# Patient Record
Sex: FEMALE | Race: WHITE | NOT HISPANIC OR LATINO | URBAN - METROPOLITAN AREA
[De-identification: names, ages, dates, MRNs, and addresses within clinical notes are randomized per-mention and may not be internally consistent; named-entity substitution may affect disease eponyms.]

---

## 2024-07-22 ENCOUNTER — HOSPITAL ENCOUNTER (INPATIENT)
Facility: HOSPITAL | Age: 89
LOS: 4 days | Discharge: HOSPICE | DRG: 481 | End: 2024-07-26
Attending: STUDENT IN AN ORGANIZED HEALTH CARE EDUCATION/TRAINING PROGRAM | Admitting: HOSPITALIST
Payer: MEDICARE

## 2024-07-22 ENCOUNTER — ANESTHESIA EVENT (OUTPATIENT)
Dept: SURGERY | Facility: HOSPITAL | Age: 89
End: 2024-07-22
Payer: MEDICARE

## 2024-07-22 DIAGNOSIS — S72.452A CLOSED SUPRACONDYLAR FRACTURE OF LEFT FEMUR, INITIAL ENCOUNTER: Primary | ICD-10-CM
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
## 2024-07-22 DIAGNOSIS — T14.90XA TRAUMA: ICD-10-CM

## 2024-07-22 DIAGNOSIS — S72.492A OTHER CLOSED FRACTURE OF DISTAL END OF LEFT FEMUR, INITIAL ENCOUNTER: ICD-10-CM

## 2024-07-22 DIAGNOSIS — Z01.818 PRE-OP EVALUATION: ICD-10-CM

## 2024-07-22 DIAGNOSIS — S72.92XA CLOSED FRACTURE OF LEFT FEMUR, UNSPECIFIED FRACTURE MORPHOLOGY, UNSPECIFIED PORTION OF FEMUR, INITIAL ENCOUNTER: ICD-10-CM

## 2024-07-22 PROBLEM — Z71.89 ACP (ADVANCE CARE PLANNING): Status: ACTIVE | Noted: 2024-07-22

## 2024-07-22 PROBLEM — F03.90 DEMENTIA WITHOUT BEHAVIORAL DISTURBANCE, PSYCHOTIC DISTURBANCE, MOOD DISTURBANCE, OR ANXIETY: Status: ACTIVE | Noted: 2024-07-22

## 2024-07-22 PROBLEM — N28.9 KIDNEY DISEASE: Status: ACTIVE | Noted: 2024-07-22

## 2024-07-22 PROBLEM — S72.402A CLOSED FRACTURE OF LEFT DISTAL FEMUR: Status: ACTIVE | Noted: 2024-07-22

## 2024-07-22 PROBLEM — D64.9 NORMOCYTIC ANEMIA: Status: ACTIVE | Noted: 2024-07-22

## 2024-07-22 PROBLEM — I10 HTN (HYPERTENSION): Status: ACTIVE | Noted: 2024-07-22

## 2024-07-22 LAB
25(OH)D3+25(OH)D2 SERPL-MCNC: 33 NG/ML (ref 30–96)
ABO + RH BLD: NORMAL
ANION GAP SERPL CALC-SCNC: 7 MMOL/L (ref 8–16)
BASOPHILS # BLD AUTO: 0.03 K/UL (ref 0–0.2)
BASOPHILS NFR BLD: 0.3 % (ref 0–1.9)
BILIRUB UR QL STRIP: NEGATIVE
BLD GP AB SCN CELLS X3 SERPL QL: NORMAL
BNP SERPL-MCNC: 187 PG/ML (ref 0–99)
BUN SERPL-MCNC: 50 MG/DL (ref 10–30)
CALCIUM SERPL-MCNC: 8.4 MG/DL (ref 8.7–10.5)
CHLORIDE SERPL-SCNC: 108 MMOL/L (ref 95–110)
CLARITY UR REFRACT.AUTO: CLEAR
CO2 SERPL-SCNC: 23 MMOL/L (ref 23–29)
COLOR UR AUTO: YELLOW
CREAT SERPL-MCNC: 1.7 MG/DL (ref 0.5–1.4)
DIFFERENTIAL METHOD BLD: ABNORMAL
EOSINOPHIL # BLD AUTO: 0 K/UL (ref 0–0.5)
EOSINOPHIL NFR BLD: 0 % (ref 0–8)
ERYTHROCYTE [DISTWIDTH] IN BLOOD BY AUTOMATED COUNT: 14.9 % (ref 11.5–14.5)
EST. GFR  (NO RACE VARIABLE): 27.1 ML/MIN/1.73 M^2
ESTIMATED AVG GLUCOSE: 108 MG/DL (ref 68–131)
GLUCOSE SERPL-MCNC: 143 MG/DL (ref 70–110)
GLUCOSE UR QL STRIP: ABNORMAL
HBA1C MFR BLD: 5.4 % (ref 4–5.6)
HCT VFR BLD AUTO: 26.9 % (ref 37–48.5)
HGB BLD-MCNC: 8.4 G/DL (ref 12–16)
HGB UR QL STRIP: ABNORMAL
HIV 1+2 AB+HIV1 P24 AG SERPL QL IA: NORMAL
IMM GRANULOCYTES # BLD AUTO: 0.37 K/UL (ref 0–0.04)
IMM GRANULOCYTES NFR BLD AUTO: 3.5 % (ref 0–0.5)
INR PPP: 0.9 (ref 0.8–1.2)
KETONES UR QL STRIP: NEGATIVE
LEUKOCYTE ESTERASE UR QL STRIP: NEGATIVE
LYMPHOCYTES # BLD AUTO: 0.7 K/UL (ref 1–4.8)
LYMPHOCYTES NFR BLD: 6.4 % (ref 18–48)
MAGNESIUM SERPL-MCNC: 2.5 MG/DL (ref 1.6–2.6)
MCH RBC QN AUTO: 30 PG (ref 27–31)
MCHC RBC AUTO-ENTMCNC: 31.2 G/DL (ref 32–36)
MCV RBC AUTO: 96 FL (ref 82–98)
MONOCYTES # BLD AUTO: 0.8 K/UL (ref 0.3–1)
MONOCYTES NFR BLD: 7.3 % (ref 4–15)
NEUTROPHILS # BLD AUTO: 8.8 K/UL (ref 1.8–7.7)
NEUTROPHILS NFR BLD: 82.5 % (ref 38–73)
NITRITE UR QL STRIP: NEGATIVE
NRBC BLD-RTO: 0 /100 WBC
PH UR STRIP: 5 [PH] (ref 5–8)
PHOSPHATE SERPL-MCNC: 3.2 MG/DL (ref 2.7–4.5)
PLATELET # BLD AUTO: 334 K/UL (ref 150–450)
PMV BLD AUTO: 11.4 FL (ref 9.2–12.9)
POTASSIUM SERPL-SCNC: 4.7 MMOL/L (ref 3.5–5.1)
PREALB SERPL-MCNC: 22 MG/DL (ref 20–43)
PROT UR QL STRIP: ABNORMAL
PROTHROMBIN TIME: 10.3 SEC (ref 9–12.5)
RBC # BLD AUTO: 2.8 M/UL (ref 4–5.4)
SODIUM SERPL-SCNC: 138 MMOL/L (ref 136–145)
SP GR UR STRIP: 1.02 (ref 1–1.03)
SPECIMEN OUTDATE: NORMAL
TRANSFERRIN SERPL-MCNC: 203 MG/DL (ref 200–375)
TROPONIN I SERPL DL<=0.01 NG/ML-MCNC: 0.01 NG/ML (ref 0–0.03)
URN SPEC COLLECT METH UR: ABNORMAL
WBC # BLD AUTO: 10.7 K/UL (ref 3.9–12.7)

## 2024-07-22 PROCEDURE — 84134 ASSAY OF PREALBUMIN: CPT

## 2024-07-22 PROCEDURE — 82306 VITAMIN D 25 HYDROXY: CPT

## 2024-07-22 PROCEDURE — 81003 URINALYSIS AUTO W/O SCOPE: CPT

## 2024-07-22 PROCEDURE — 83036 HEMOGLOBIN GLYCOSYLATED A1C: CPT

## 2024-07-22 PROCEDURE — 25000003 PHARM REV CODE 250: Performed by: STUDENT IN AN ORGANIZED HEALTH CARE EDUCATION/TRAINING PROGRAM

## 2024-07-22 PROCEDURE — 83735 ASSAY OF MAGNESIUM: CPT

## 2024-07-22 PROCEDURE — 84484 ASSAY OF TROPONIN QUANT: CPT | Performed by: HOSPITALIST

## 2024-07-22 PROCEDURE — 85610 PROTHROMBIN TIME: CPT

## 2024-07-22 PROCEDURE — 93010 ELECTROCARDIOGRAM REPORT: CPT | Mod: ,,, | Performed by: INTERNAL MEDICINE

## 2024-07-22 PROCEDURE — 87389 HIV-1 AG W/HIV-1&-2 AB AG IA: CPT | Performed by: PHYSICIAN ASSISTANT

## 2024-07-22 PROCEDURE — 63600175 PHARM REV CODE 636 W HCPCS: Performed by: STUDENT IN AN ORGANIZED HEALTH CARE EDUCATION/TRAINING PROGRAM

## 2024-07-22 PROCEDURE — 0QHH34Z INSERTION OF INTERNAL FIXATION DEVICE INTO LEFT TIBIA, PERCUTANEOUS APPROACH: ICD-10-PCS | Performed by: ORTHOPAEDIC SURGERY

## 2024-07-22 PROCEDURE — 96374 THER/PROPH/DIAG INJ IV PUSH: CPT

## 2024-07-22 PROCEDURE — 80048 BASIC METABOLIC PNL TOTAL CA: CPT | Performed by: STUDENT IN AN ORGANIZED HEALTH CARE EDUCATION/TRAINING PROGRAM

## 2024-07-22 PROCEDURE — 86850 RBC ANTIBODY SCREEN: CPT | Performed by: STUDENT IN AN ORGANIZED HEALTH CARE EDUCATION/TRAINING PROGRAM

## 2024-07-22 PROCEDURE — 63600175 PHARM REV CODE 636 W HCPCS

## 2024-07-22 PROCEDURE — 84100 ASSAY OF PHOSPHORUS: CPT

## 2024-07-22 PROCEDURE — 83880 ASSAY OF NATRIURETIC PEPTIDE: CPT | Performed by: HOSPITALIST

## 2024-07-22 PROCEDURE — 84466 ASSAY OF TRANSFERRIN: CPT

## 2024-07-22 PROCEDURE — 99285 EMERGENCY DEPT VISIT HI MDM: CPT | Mod: 25

## 2024-07-22 PROCEDURE — 93005 ELECTROCARDIOGRAM TRACING: CPT

## 2024-07-22 PROCEDURE — 85025 COMPLETE CBC W/AUTO DIFF WBC: CPT | Performed by: STUDENT IN AN ORGANIZED HEALTH CARE EDUCATION/TRAINING PROGRAM

## 2024-07-22 PROCEDURE — 11000001 HC ACUTE MED/SURG PRIVATE ROOM

## 2024-07-22 RX ORDER — GLUCAGON 1 MG
1 KIT INJECTION
Status: DISCONTINUED | OUTPATIENT
Start: 2024-07-22 | End: 2024-07-26 | Stop reason: HOSPADM

## 2024-07-22 RX ORDER — MORPHINE SULFATE 4 MG/ML
4 INJECTION, SOLUTION INTRAMUSCULAR; INTRAVENOUS
Status: COMPLETED | OUTPATIENT
Start: 2024-07-22 | End: 2024-07-22

## 2024-07-22 RX ORDER — TALC
6 POWDER (GRAM) TOPICAL NIGHTLY PRN
Status: DISCONTINUED | OUTPATIENT
Start: 2024-07-22 | End: 2024-07-26 | Stop reason: HOSPADM

## 2024-07-22 RX ORDER — ACETAMINOPHEN 325 MG/1
650 TABLET ORAL EVERY 4 HOURS PRN
Status: DISCONTINUED | OUTPATIENT
Start: 2024-07-22 | End: 2024-07-22

## 2024-07-22 RX ORDER — HYDROCODONE BITARTRATE AND ACETAMINOPHEN 500; 5 MG/1; MG/1
TABLET ORAL
Status: DISCONTINUED | OUTPATIENT
Start: 2024-07-22 | End: 2024-07-26 | Stop reason: HOSPADM

## 2024-07-22 RX ORDER — NALOXONE HCL 0.4 MG/ML
0.02 VIAL (ML) INJECTION
Status: DISCONTINUED | OUTPATIENT
Start: 2024-07-22 | End: 2024-07-26 | Stop reason: HOSPADM

## 2024-07-22 RX ORDER — ACETAMINOPHEN 500 MG
1000 TABLET ORAL EVERY 8 HOURS
Status: DISPENSED | OUTPATIENT
Start: 2024-07-23 | End: 2024-07-24

## 2024-07-22 RX ORDER — NAPROXEN SODIUM 220 MG/1
81 TABLET, FILM COATED ORAL DAILY
COMMUNITY

## 2024-07-22 RX ORDER — ONDANSETRON HYDROCHLORIDE 2 MG/ML
4 INJECTION, SOLUTION INTRAVENOUS EVERY 8 HOURS PRN
Status: DISCONTINUED | OUTPATIENT
Start: 2024-07-22 | End: 2024-07-26 | Stop reason: HOSPADM

## 2024-07-22 RX ORDER — AMLODIPINE BESYLATE 5 MG/1
5 TABLET ORAL DAILY
Status: DISCONTINUED | OUTPATIENT
Start: 2024-07-23 | End: 2024-07-26 | Stop reason: HOSPADM

## 2024-07-22 RX ORDER — AMLODIPINE BESYLATE 5 MG/1
5 TABLET ORAL DAILY
COMMUNITY

## 2024-07-22 RX ORDER — MEMANTINE HYDROCHLORIDE 5 MG/1
5 TABLET ORAL 2 TIMES DAILY
COMMUNITY

## 2024-07-22 RX ORDER — TIMOLOL MALEATE 2.5 MG/ML
1 SOLUTION/ DROPS OPHTHALMIC NIGHTLY
Status: DISCONTINUED | OUTPATIENT
Start: 2024-07-23 | End: 2024-07-26 | Stop reason: HOSPADM

## 2024-07-22 RX ORDER — MORPHINE SULFATE 2 MG/ML
2 INJECTION, SOLUTION INTRAMUSCULAR; INTRAVENOUS ONCE
Status: COMPLETED | OUTPATIENT
Start: 2024-07-22 | End: 2024-07-22

## 2024-07-22 RX ORDER — IBUPROFEN 200 MG
24 TABLET ORAL
Status: DISCONTINUED | OUTPATIENT
Start: 2024-07-22 | End: 2024-07-26 | Stop reason: HOSPADM

## 2024-07-22 RX ORDER — PROCHLORPERAZINE EDISYLATE 5 MG/ML
5 INJECTION INTRAMUSCULAR; INTRAVENOUS EVERY 6 HOURS PRN
Status: DISCONTINUED | OUTPATIENT
Start: 2024-07-22 | End: 2024-07-26 | Stop reason: HOSPADM

## 2024-07-22 RX ORDER — IBUPROFEN 200 MG
16 TABLET ORAL
Status: DISCONTINUED | OUTPATIENT
Start: 2024-07-22 | End: 2024-07-26 | Stop reason: HOSPADM

## 2024-07-22 RX ORDER — ONDANSETRON 4 MG/1
4 TABLET, ORALLY DISINTEGRATING ORAL
Status: COMPLETED | OUTPATIENT
Start: 2024-07-22 | End: 2024-07-22

## 2024-07-22 RX ORDER — QUETIAPINE FUMARATE 100 MG/1
100 TABLET, FILM COATED ORAL NIGHTLY
Status: DISCONTINUED | OUTPATIENT
Start: 2024-07-22 | End: 2024-07-26 | Stop reason: HOSPADM

## 2024-07-22 RX ORDER — NAPROXEN SODIUM 220 MG/1
81 TABLET, FILM COATED ORAL DAILY
Status: DISCONTINUED | OUTPATIENT
Start: 2024-07-23 | End: 2024-07-26 | Stop reason: HOSPADM

## 2024-07-22 RX ORDER — SODIUM CHLORIDE 0.9 % (FLUSH) 0.9 %
10 SYRINGE (ML) INJECTION
Status: DISCONTINUED | OUTPATIENT
Start: 2024-07-22 | End: 2024-07-26 | Stop reason: HOSPADM

## 2024-07-22 RX ORDER — OXYCODONE HYDROCHLORIDE 5 MG/1
5 TABLET ORAL EVERY 6 HOURS PRN
Status: DISCONTINUED | OUTPATIENT
Start: 2024-07-22 | End: 2024-07-23

## 2024-07-22 RX ORDER — AMOXICILLIN 250 MG
1 CAPSULE ORAL DAILY
Status: DISCONTINUED | OUTPATIENT
Start: 2024-07-23 | End: 2024-07-26 | Stop reason: HOSPADM

## 2024-07-22 RX ORDER — MEMANTINE HYDROCHLORIDE 5 MG/1
5 TABLET ORAL 2 TIMES DAILY
Status: DISCONTINUED | OUTPATIENT
Start: 2024-07-22 | End: 2024-07-26 | Stop reason: HOSPADM

## 2024-07-22 RX ORDER — QUETIAPINE FUMARATE 25 MG/1
50 TABLET, FILM COATED ORAL DAILY
Status: DISCONTINUED | OUTPATIENT
Start: 2024-07-23 | End: 2024-07-23

## 2024-07-22 RX ORDER — QUETIAPINE FUMARATE 100 MG/1
50 TABLET, FILM COATED ORAL
COMMUNITY

## 2024-07-22 RX ORDER — OXYCODONE HYDROCHLORIDE 10 MG/1
10 TABLET ORAL EVERY 6 HOURS PRN
Status: DISCONTINUED | OUTPATIENT
Start: 2024-07-22 | End: 2024-07-23

## 2024-07-22 RX ADMIN — MORPHINE SULFATE 4 MG: 4 INJECTION INTRAVENOUS at 06:07

## 2024-07-22 RX ADMIN — ONDANSETRON 4 MG: 4 TABLET, ORALLY DISINTEGRATING ORAL at 03:07

## 2024-07-22 RX ADMIN — MORPHINE SULFATE 2 MG: 2 INJECTION, SOLUTION INTRAMUSCULAR; INTRAVENOUS at 08:07

## 2024-07-22 NOTE — HPI
Barby Burrell is a 89 y.o. female with PMH significant for R hip fx 5 years ago s/p IMN and L hip fx 4 years ago s/p short IMN, HTN and dementia presenting with left leg pain. Patient lives in a care facility and on Friday morning her daughter noticed some left distal femur swelling. Patient was also having left thigh pain. Due to persistent left thigh pain over the weekend despite medications, she had an XR done today at the facility which showed a left distal femur fracture. Around 1 week ago, the patient was also having some right knee pain. The family is unsure of how this fracture happened and are not aware of any recents falls or trauma. Patient moved to her current care facility around 1 month ago as she was previously living with her daughters who were providing care. She does have a history of falls. Patient denies numbness and tingling. She has some right knee pain today as well. She is nonambulatory at baseline. Does not take any anticoagulation at baseline.

## 2024-07-22 NOTE — ED NOTES
Pt had urinary incontinence episode. Bed was stripped and sanitized, clean bed sheets placed. Pt was cleaned and changed into new gown. Pt resting comfortably at this time.

## 2024-07-22 NOTE — FIRST PROVIDER EVALUATION
Medical screening examination initiated.  I have conducted a focused provider triage encounter, findings are as follows:    Brief history of present illness:  presents from hospice w/ L upper leg deformity. No h/o trauma    Vitals:    07/22/24 1440   BP: 123/86   Pulse: 63   Resp: 18   Temp: 98.8 °F (37.1 °C)   TempSrc: Oral   SpO2: 95%       Pertinent physical exam:  bed bound    Brief workup plan:  xr L hip, femur, knee    Preliminary workup initiated; this workup will be continued and followed by the physician or advanced practice provider that is assigned to the patient when roomed.

## 2024-07-22 NOTE — ED PROVIDER NOTES
Encounter Date: 7/22/2024       History     Chief Complaint   Patient presents with    Leg Injury     Arrived via ems from [address] [place] [address] with c/o deformity to left upper leg, denies pt with fall or trauma, x-ray done today with distal femur fracture diagnosed, pt oriented to self only at baseline     HPI    89 y/o F PMH HTN, dementia who presents to the ED for eval of leg pain.  Patient lives in a [facility] hosp[facility] facility. Two days ago the daughter noticed significant swelling of the left/right knees. Today she had an XR done at the facility showing a large fracture of the left distal femur.  Family brought her in for evaluation.  She was unaware of any fall that was reported at the facility.  Patient is largely wheelchair-bound/bed-bound.  She does not walk independently.  Patient is complaining of bilateral hip and leg pain in the ED, she was able to provide some history.      Review of patient's allergies indicates:  Not on File  No past medical history on file.  No past surgical history on file.  No family history on file.     Review of Systems   Constitutional:  Negative for fever.   Respiratory:  Negative for shortness of breath.    Cardiovascular:  Negative for chest pain.   Musculoskeletal:  Positive for arthralgias and joint swelling.       Physical Exam     Initial Vitals [07/22/24 1440]   BP Pulse Resp Temp SpO2   123/86 63 18 98.8 °F (37.1 °C) 95 %      MAP       --         Physical Exam    Nursing note and vitals reviewed.  Constitutional: No distress.   HENT:   No signs of severe head or neck trauma   Eyes: EOM are normal.   Neck: Neck supple.   No C-spine tenderness to palpation   Normal range of motion.  Cardiovascular:  Normal rate and regular rhythm.           Pulmonary/Chest: Breath sounds normal. No respiratory distress.   Abdominal: Abdomen is soft. There is no abdominal tenderness.   Musculoskeletal:      Cervical back: Normal range of motion and neck supple.      Comments:  Significant pain and swelling of the left and right knees, pain with range of motion of both knees, good pulses DP LLE     Neurological: She is alert.   Answers most questions, intermittent confusion which is reportedly baseline, moves both upper extremities   Skin: Skin is warm and dry.   Bruising noted of the distal lower extremities   Psychiatric: She has a normal mood and affect. Thought content normal.         ED Course   Procedures  Labs Reviewed   CBC W/ AUTO DIFFERENTIAL - Abnormal       Result Value    WBC 10.70      RBC 2.80 (*)     Hemoglobin 8.4 (*)     Hematocrit 26.9 (*)     MCV 96      MCH 30.0      MCHC 31.2 (*)     RDW 14.9 (*)     Platelets 334      MPV 11.4      Immature Granulocytes 3.5 (*)     Gran # (ANC) 8.8 (*)     Immature Grans (Abs) 0.37 (*)     Lymph # 0.7 (*)     Mono # 0.8      Eos # 0.0      Baso # 0.03      nRBC 0      Gran % 82.5 (*)     Lymph % 6.4 (*)     Mono % 7.3      Eosinophil % 0.0      Basophil % 0.3      Differential Method Automated     BASIC METABOLIC PANEL - Abnormal    Sodium 138      Potassium 4.7      Chloride 108      CO2 23      Glucose 143 (*)     BUN 50 (*)     Creatinine 1.7 (*)     Calcium 8.4 (*)     Anion Gap 7 (*)     eGFR 27.1 (*)    B-TYPE NATRIURETIC PEPTIDE - Abnormal     (*)    HIV 1 / 2 ANTIBODY    HIV 1/2 Ag/Ab Non-reactive      Narrative:     Release to patient->Immediate   TROPONIN I    Troponin I 0.014     HEMOGLOBIN A1C    Hemoglobin A1C 5.4      Estimated Avg Glucose 108     MAGNESIUM    Magnesium 2.5     PHOSPHORUS    Phosphorus 3.2     PREALBUMIN    Prealbumin 22     PROTIME-INR    Prothrombin Time 10.3      INR 0.9     TRANSFERRIN    Transferrin 203     VITAMIN D    Vit D, 25-Hydroxy 33     TYPE & SCREEN    Group & Rh A NEG      Indirect Jennifer NEG      Specimen Outdate 07/25/2024 23:59            Imaging Results              CT 3D Rendering WO Independent Workstation (Final result)  Result time 07/22/24 23:01:20      Final result by  "Magdi Bautista MD (07/22/24 23:01:20)                   Impression:      Comminuted and displaced fracture of the distal femur shaft.      Electronically signed by: Magdi Bautista MD  Date:    07/22/2024  Time:    23:01               Narrative:    EXAMINATION:  CT THIGH WITHOUT CONTRAST LEFT; CT 3D RENDERING WO INDEPENDENT WORKSTATION    CLINICAL HISTORY:  Provided history is "knee trauma, occult fracture suspected".    TECHNIQUE:  CT images of the left thigh obtained without IV contrast.  Axial, coronal, and sagittal reconstructions were created from the source data.    3D reconstructed images were acquired by post processing on an independent workstation with concurrent supervision and archived for permanent record. 3D imaging of the left thigh was obtained for further evaluation and for operative planning if needed.    COMPARISON:  Left femur radiograph, 07/22/2024.    FINDINGS:  Bones are demineralized.  Operative changes of ORIF with intramedullary christen in the left proximal femur.  Comminuted and displaced acute fracture in the distal femur shaft with anteromedial displacement of the distal fracture fragments.  Approximately 2.5 cm fracture fragment separation in the distal femoral shaft best seen on the sagittal images.  No convincing intra-articular fracture.  No dislocation.  Degenerative changes in the knee and hip.  Soft tissue edema adjacent to the fracture site.  No significant subcutaneous emphysema.  Trace suprapatellar effusion.                                       CT Thigh Without Contrast Left (Final result)  Result time 07/22/24 23:01:20   Procedure changed from CT Knee Without Contrast Left     Final result by Magdi Bautista MD (07/22/24 23:01:20)                   Impression:      Comminuted and displaced fracture of the distal femur shaft.      Electronically signed by: Magdi Bautista MD  Date:    07/22/2024  Time:    23:01               Narrative:    EXAMINATION:  CT THIGH WITHOUT " "CONTRAST LEFT; CT 3D RENDERING WO INDEPENDENT WORKSTATION    CLINICAL HISTORY:  Provided history is "knee trauma, occult fracture suspected".    TECHNIQUE:  CT images of the left thigh obtained without IV contrast.  Axial, coronal, and sagittal reconstructions were created from the source data.    3D reconstructed images were acquired by post processing on an independent workstation with concurrent supervision and archived for permanent record. 3D imaging of the left thigh was obtained for further evaluation and for operative planning if needed.    COMPARISON:  Left femur radiograph, 07/22/2024.    FINDINGS:  Bones are demineralized.  Operative changes of ORIF with intramedullary christen in the left proximal femur.  Comminuted and displaced acute fracture in the distal femur shaft with anteromedial displacement of the distal fracture fragments.  Approximately 2.5 cm fracture fragment separation in the distal femoral shaft best seen on the sagittal images.  No convincing intra-articular fracture.  No dislocation.  Degenerative changes in the knee and hip.  Soft tissue edema adjacent to the fracture site.  No significant subcutaneous emphysema.  Trace suprapatellar effusion.                                       X-Ray Knee 3 View Left (Final result)  Result time 07/22/24 22:39:59      Final result by Magdi Bautista MD (07/22/24 22:39:59)                   Impression:      See findings above.      Electronically signed by: Magdi Bautista MD  Date:    07/22/2024  Time:    22:39               Narrative:    EXAMINATION:  XR KNEE 3 VIEW LEFT    CLINICAL HISTORY:  Traction pin eval;    TECHNIQUE:  AP, lateral, and Merchant views of the left knee were performed.    COMPARISON:  07/22/2024 at 16:45.    FINDINGS:  Similar to mildly improved alignment of comminuted and displaced distal femur fracture with traction in place.  No obvious new acute displaced fracture.  Bones are demineralized.  Further evaluation/follow-up as " warranted clinically.                                       X-Ray Chest AP Portable (Final result)  Result time 07/22/24 18:43:41      Final result by Filipe Smalls MD (07/22/24 18:43:41)                   Impression:      1. Interstitial findings are accentuated by habitus and shallow inspiratory effort.  Underlying edema is a consideration.  Correlation however is needed.      Electronically signed by: Filipe Smalls MD  Date:    07/22/2024  Time:    18:43               Narrative:    EXAMINATION:  XR CHEST AP PORTABLE    CLINICAL HISTORY:  Pre-op clearance;    TECHNIQUE:  Single frontal view of the chest was performed.    COMPARISON:  None    FINDINGS:  The cardiomediastinal silhouette is not enlarged, magnified by technique.  There is elevation of the right hemidiaphragm..  There is obscuration of the left costophrenic angle, possibly reflecting effusion..  The trachea is midline.  The lungs are symmetrically expanded bilaterally with coarse interstitial attenuation and left basilar subsegmental atelectasis..  No large focal consolidation seen.  There is no pneumothorax.  The osseous structures are remarkable for degenerative change noting osteopenia..                                       X-Ray Femur AP/LAT Left (Final result)  Result time 07/22/24 17:42:53      Final result by Filipe Smalls MD (07/22/24 17:42:53)                   Impression:      1. Distal left femoral fracture as described.      Electronically signed by: Filipe Smalls MD  Date:    07/22/2024  Time:    17:42               Narrative:    EXAMINATION:  XR FEMUR 2 VIEW LEFT    CLINICAL HISTORY:  Unspecified fall, initial encounter    TECHNIQUE:  AP and lateral views of the left femur were performed.    COMPARISON:  None}    FINDINGS:  Four views left femur.    The left femoral head maintains appropriate relationship with the acetabulum noting femoroacetabular degenerative change.  Intramedullary christen and nail construct noted of the  proximal femur.  There is comminuted displaced and impacted fracture involving the distal aspect of the left femur noting several fracture fragments lie about the fracture plane.  The knee appears otherwise intact however CT could be performed for more definitive evaluation as warranted.  There is osteopenia.                                       X-Ray Knee 3 View Left (Final result)  Result time 07/22/24 17:38:06      Final result by Filipe Smalls MD (07/22/24 17:38:06)                   Impression:      As above      Electronically signed by: Filipe Smalls MD  Date:    07/22/2024  Time:    17:38               Narrative:    EXAMINATION:  XR KNEE 3 VIEW LEFT    CLINICAL HISTORY:  Unspecified fall, initial encounter    TECHNIQUE:  AP, lateral, and Merchant views of the left knee were performed.    COMPARISON:  None    FINDINGS:  Two views left knee.    Please note, positioning is suboptimal as patient has distal left femoral fracture.    Allowing for positioning, no convincing acute displaced fracture or dislocation of the knee noting degenerative changes.  There is osteopenia.                                       XR Tibia Fibula 2 View Bilateral (Edited Result - FINAL)  Result time 07/22/24 17:40:28      Addendum (preliminary) 1 of 1 by Filipe Smalls MD (07/22/24 17:40:28)      Following image interpretation, radiographs became available of the right knee.  On that series, there is cortical irregularity involving the lateral aspect of the medial tibial plateau posteriorly, concerning for fracture.  Please see that exam.  Correlation with any focal tenderness recommended.      Electronically signed by: Filipe Smalls MD  Date:    07/22/2024  Time:    17:40                 Final result by Filipe Smalls MD (07/22/24 17:37:25)                   Impression:      1. No convincing acute displaced fracture or dislocation of the left or right tibia/fibula.  Patient has known distal left femoral fracture  described in separate report.      Electronically signed by: Filipe Smalls MD  Date:    07/22/2024  Time:    17:37               Narrative:    EXAMINATION:  XR TIBIA FIBULA 2 VIEW BILATERAL    CLINICAL HISTORY:  Injury, unspecified, initial encounter    COMPARISON:  None    FINDINGS:  Eight views bilateral tibia fibula.    There are degenerative changes of the knees.  There is osteopenia.  No acute displaced fracture or dislocation of the left or right tibia/fibula.  The left ankle mortise is intact.  The right ankle mortise is intact.  There are degenerative changes of the left and right calcaneus.  There is vascular calcification.  There is remote fracture of the left fibular diaphysis.                                       X-Ray Knee 3 View Right (Final result)  Result time 07/22/24 17:39:45      Final result by Filipe Smalls MD (07/22/24 17:39:45)                   Impression:      1. There is cortical irregularity involving the posterior aspect of the medial tibial plateau.  This may reflect fracture noting this was not readily apparent on the tib fib radiographs, likely related to positioning.  Correlation with any focal tenderness recommended.      Electronically signed by: Filipe Smalls MD  Date:    07/22/2024  Time:    17:39               Narrative:    EXAMINATION:  XR KNEE 3 VIEW RIGHT    CLINICAL HISTORY:  Injury, unspecified, initial encounter    TECHNIQUE:  AP, lateral, and Merchant views of the right knee were performed.    COMPARISON:  None    FINDINGS:  Three views right knee.    There is osteopenia.  There are degenerative changes of the knee.  On lateral view, there is concern for fracture of the posterior medial tibial plateau.  This was not evident on the tib fib radiographs, may be on the basis of positioning.  Correlation however with any focal tenderness is recommended as there is extensive degenerative change in the region.  No large knee joint effusion.                                        X-Ray Femur Ap/Lat Right (Final result)  Result time 07/22/24 17:35:56      Final result by Filipe Smalls MD (07/22/24 17:35:56)                   Impression:      1. No convincing acute displaced fracture or dislocation of the right femur noting comparison with previous examinations would be helpful to assess any interval nonunion.      Electronically signed by: Filipe Smalls MD  Date:    07/22/2024  Time:    17:35               Narrative:    EXAMINATION:  XR FEMUR 2 VIEW RIGHT    CLINICAL HISTORY:  Injury, unspecified, initial encounter    TECHNIQUE:  AP and lateral views of the right femur were performed.    COMPARISON:  None    FINDINGS:  Four views right femur.    The right femoral head maintains appropriate relationship with the acetabulum.  There is diffuse osteopenia.  Intramedullary christen and screw construct noted of the proximal femur.  Comparison with any previous examinations would be helpful 2 assess any regions of nonunion.  The distal aspect of the femur is intact.  There are degenerative changes of the knee.  No large knee joint effusion.  Please see separately dictated report for details of the left femoral fracture.                                       X-Ray Hips Bilateral 2 View Incl AP Pelvis (Final result)  Result time 07/22/24 17:51:05      Final result by Sim Dorsey MD (07/22/24 17:51:05)                   Impression:      No evidence of acute fracture or traumatic malalignment of the right hip or the pelvis.    Postop changes of prior ORIF of both hips.      Electronically signed by: Sim Dorsey MD  Date:    07/22/2024  Time:    17:51               Narrative:    EXAMINATION:  XR HIPS BILATERAL 2 VIEW INCL AP PELVIS    CLINICAL HISTORY:  Injury, unspecified, initial encounter    TECHNIQUE:  AP view of the pelvis and frogleg lateral views of both hips were performed.    COMPARISON:  None.    FINDINGS:  There are postop changes of bilateral open reduction internal fixation  of both proximal femurs for prior proximal femur fractures.  There is no acute fracture on the current examination, allowing for diffuse osteopenia.    There is joint space narrowing with osteophytosis.  There are no erosive changes.    The soft tissues are unremarkable.  No radiopaque foreign body is identified.                                       CT Head Without Contrast (Final result)  Result time 07/22/24 16:50:55      Final result by Chi Gomes DO (07/22/24 16:50:55)                   Impression:      CT head: No acute intracranial findings specifically without evidence for acute intracranial hemorrhage or sulcal effacement to suggest large territory recent infarction    Cerebral volume loss with patchy and confluent decreased attenuation supratentorial white matter while nonspecific concerning for chronic ischemic change.  Prominence of the lateral and 3rd ventricles which may be compensatory to volume loss component of normal pressure hydrocephalus not excluded.    CT cervical spine: Spondylo degenerative changes cervical spine with superimposed diffuse idiopathic skeletal hyperostosis.  No evidence for acute fracture or traumatic subluxation cervical spine.  Further evaluation as warranted clinically.    Electronically signed by resident: Michael Amaya  Date:    07/22/2024  Time:    16:13    Electronically signed by: Chi Gomes DO  Date:    07/22/2024  Time:    16:50               Narrative:    EXAMINATION:  CT HEAD WITHOUT CONTRAST; CT CERVICAL SPINE WITHOUT CONTRAST    CLINICAL HISTORY:  Head trauma, minor (Age >= 65y);; Neck trauma (Age >= 65y);    TECHNIQUE:  Low dose axial CT images obtained throughout the head and cervical spine without intravenous contrast.  Axial, sagittal and coronal reconstructions were performed.    COMPARISON:  None.    FINDINGS:  Head:    Generalized cerebral volume loss with compensatory enlargement of the ventricles.  Slight prominence lateral and 3rd ventricles  which may be compensatory to volume loss normal pressure hydrocephalus not excluded.    Moderate patchy and confluent hypoattenuation in the supratentorial white matter, nonspecific but most likely reflecting chronic microvascular ischemic changes.  Focal hypodensity in the right basal ganglia, likely prominent perivascular space allowing for distortion by motion artifacts there is no evidence for acute intracranial hemorrhage or sulcal effacement to suggest large territory recent infarction..    Visualized paranasal sinuses and mastoid air cells are clear.    Spine: Cervical sagittal alignment within limits with trace grade 1 anterolisthesis C2 on C3 and C7 on T1.    Prominent bridging osteophytes majority of the cervical and visualized upper thoracic levels compatible with diffuse idiopathic skeletal hyperostosis    Allowing for degenerative changes cervical vertebral body heights and contours are within normal is without evidence for acute fracture.  No consolidation visualized lung apices    Degenerative change atlantal dental joint with probable ligamental calcification along the posterior aspect the odontoid.    Allowing for CT technique degenerative change most prominent at C3/C4 with posterior disc osteophyte, uncovertebral joint hypertrophy and facet arthropathy mild central canal narrowing with moderate right neural foraminal stenosis.                                       CT Cervical Spine Without Contrast (Final result)  Result time 07/22/24 16:50:55      Final result by Chi Gomes DO (07/22/24 16:50:55)                   Impression:      CT head: No acute intracranial findings specifically without evidence for acute intracranial hemorrhage or sulcal effacement to suggest large territory recent infarction    Cerebral volume loss with patchy and confluent decreased attenuation supratentorial white matter while nonspecific concerning for chronic ischemic change.  Prominence of the lateral and 3rd  ventricles which may be compensatory to volume loss component of normal pressure hydrocephalus not excluded.    CT cervical spine: Spondylo degenerative changes cervical spine with superimposed diffuse idiopathic skeletal hyperostosis.  No evidence for acute fracture or traumatic subluxation cervical spine.  Further evaluation as warranted clinically.    Electronically signed by resident: Michael Amaya  Date:    07/22/2024  Time:    16:13    Electronically signed by: Chi Gomes DO  Date:    07/22/2024  Time:    16:50               Narrative:    EXAMINATION:  CT HEAD WITHOUT CONTRAST; CT CERVICAL SPINE WITHOUT CONTRAST    CLINICAL HISTORY:  Head trauma, minor (Age >= 65y);; Neck trauma (Age >= 65y);    TECHNIQUE:  Low dose axial CT images obtained throughout the head and cervical spine without intravenous contrast.  Axial, sagittal and coronal reconstructions were performed.    COMPARISON:  None.    FINDINGS:  Head:    Generalized cerebral volume loss with compensatory enlargement of the ventricles.  Slight prominence lateral and 3rd ventricles which may be compensatory to volume loss normal pressure hydrocephalus not excluded.    Moderate patchy and confluent hypoattenuation in the supratentorial white matter, nonspecific but most likely reflecting chronic microvascular ischemic changes.  Focal hypodensity in the right basal ganglia, likely prominent perivascular space allowing for distortion by motion artifacts there is no evidence for acute intracranial hemorrhage or sulcal effacement to suggest large territory recent infarction..    Visualized paranasal sinuses and mastoid air cells are clear.    Spine: Cervical sagittal alignment within limits with trace grade 1 anterolisthesis C2 on C3 and C7 on T1.    Prominent bridging osteophytes majority of the cervical and visualized upper thoracic levels compatible with diffuse idiopathic skeletal hyperostosis    Allowing for degenerative changes cervical vertebral  body heights and contours are within normal is without evidence for acute fracture.  No consolidation visualized lung apices    Degenerative change atlantal dental joint with probable ligamental calcification along the posterior aspect the odontoid.    Allowing for CT technique degenerative change most prominent at C3/C4 with posterior disc osteophyte, uncovertebral joint hypertrophy and facet arthropathy mild central canal narrowing with moderate right neural foraminal stenosis.                                       Medications   sodium chloride 0.9% flush 10 mL (has no administration in time range)   melatonin tablet 6 mg (has no administration in time range)   ondansetron injection 4 mg (has no administration in time range)   prochlorperazine injection Soln 5 mg (has no administration in time range)   naloxone 0.4 mg/mL injection 0.02 mg (has no administration in time range)   glucose chewable tablet 16 g (has no administration in time range)   glucose chewable tablet 24 g (has no administration in time range)   glucagon (human recombinant) injection 1 mg (has no administration in time range)   dextrose 10% bolus 125 mL 125 mL (has no administration in time range)   dextrose 10% bolus 250 mL 250 mL (has no administration in time range)   0.9%  NaCl infusion (for blood administration) (has no administration in time range)   oxyCODONE immediate release tablet 5 mg (has no administration in time range)   oxyCODONE immediate release tablet Tab 10 mg (has no administration in time range)   amLODIPine tablet 5 mg (has no administration in time range)   aspirin chewable tablet 81 mg (has no administration in time range)   memantine tablet 5 mg (has no administration in time range)   QUEtiapine tablet 100 mg (has no administration in time range)     And   QUEtiapine tablet 50 mg (has no administration in time range)   timolol maleate 0.25% ophthalmic solution 1 drop (has no administration in time range)   acetaminophen  tablet 1,000 mg (has no administration in time range)   senna-docusate 8.6-50 mg per tablet 1 tablet (has no administration in time range)   ondansetron disintegrating tablet 4 mg (4 mg Oral Given 7/22/24 1549)   morphine injection 4 mg (4 mg Intravenous Given 7/22/24 1815)   morphine injection 2 mg (2 mg Intravenous Given 7/22/24 2030)     Medical Decision Making  Amount and/or Complexity of Data Reviewed  Labs: ordered.  Radiology: ordered.    Risk  Prescription drug management.  Decision regarding hospitalization.               ED Course as of 07/23/24 0035 Mon Jul 22, 2024   1515 Home XR reviewed on caregiver's phone. Ortho consult and repeat views placed [DS]      ED Course User Index  [DS] Sessions, Shade KO MD                       89 F here with leg pain.  VSS in ED, acute deformity of the left knee, bruising of b/l LE.  Xrs show acute distal left femur fracture.  Normal WBC, normal lytes.  CTB and CT C spine normal.  Orthopedics consulted, they will see patient.  Admitted to . Family updated and agreeable with plan.      Clinical Impression:  Final diagnoses:  [W19.XXXA] Fall  [T14.90XA] Trauma  [S72.92XA] Closed fracture of left femur, unspecified fracture morphology, unspecified portion of femur, initial encounter          ED Disposition Condition    Admit Stable                Shade Linares MD  07/23/24 0035

## 2024-07-22 NOTE — ED TRIAGE NOTES
Pt presents to the ED with complaints of left leg pain. Pt is a poor historian, but she denies any trauma or fall. Pt oriented only to self. She moans in pain upon palpation of the left leg.

## 2024-07-22 NOTE — Clinical Note
Diagnosis: Fall [873139]   Future Attending Provider: JORDYN HENRY [[id number]]   Reason for IP Medical Treatment  (Clinical interventions that can only be accomplished in the IP setting? ) :: femur fracture   I certify that Inpatient services for greater than or equal to 2 midnights are medically necessary:: Yes   Plans for Post-Acute care--if anticipated (pick the single best option):: D. Skilled Nursing Placement

## 2024-07-22 NOTE — ED NOTES
Pt had an episode of urinary incontinence in her stretcher. Took her to a private cubby via stretcher and cleaned her up and changed her sheets. Given clean blanket.

## 2024-07-23 ENCOUNTER — ANESTHESIA (OUTPATIENT)
Dept: SURGERY | Facility: HOSPITAL | Age: 89
End: 2024-07-23
Payer: MEDICARE

## 2024-07-23 PROBLEM — Z51.5 HOSPICE CARE PATIENT: Status: ACTIVE | Noted: 2024-07-23

## 2024-07-23 PROBLEM — H40.9 GLAUCOMA: Status: ACTIVE | Noted: 2024-07-23

## 2024-07-23 PROBLEM — S72.452A CLOSED SUPRACONDYLAR FRACTURE OF LEFT FEMUR: Status: ACTIVE | Noted: 2024-07-23

## 2024-07-23 PROBLEM — N17.9 ACUTE KIDNEY INJURY: Status: ACTIVE | Noted: 2024-07-22

## 2024-07-23 PROBLEM — I48.0 PAROXYSMAL ATRIAL FIBRILLATION: Status: ACTIVE | Noted: 2024-07-23

## 2024-07-23 LAB
ALBUMIN SERPL BCP-MCNC: 3.4 G/DL (ref 3.5–5.2)
ALP SERPL-CCNC: 87 U/L (ref 55–135)
ALT SERPL W/O P-5'-P-CCNC: 13 U/L (ref 10–44)
ANION GAP SERPL CALC-SCNC: 8 MMOL/L (ref 8–16)
AST SERPL-CCNC: 17 U/L (ref 10–40)
BASOPHILS # BLD AUTO: 0.05 K/UL (ref 0–0.2)
BASOPHILS NFR BLD: 0.4 % (ref 0–1.9)
BILIRUB SERPL-MCNC: 0.9 MG/DL (ref 0.1–1)
BLD PROD TYP BPU: NORMAL
BLOOD UNIT EXPIRATION DATE: NORMAL
BLOOD UNIT TYPE CODE: 600
BLOOD UNIT TYPE: NORMAL
BUN SERPL-MCNC: 42 MG/DL (ref 10–30)
CALCIUM SERPL-MCNC: 8.9 MG/DL (ref 8.7–10.5)
CHLORIDE SERPL-SCNC: 108 MMOL/L (ref 95–110)
CO2 SERPL-SCNC: 25 MMOL/L (ref 23–29)
CODING SYSTEM: NORMAL
CREAT SERPL-MCNC: 1.2 MG/DL (ref 0.5–1.4)
CROSSMATCH INTERPRETATION: NORMAL
DIFFERENTIAL METHOD BLD: ABNORMAL
DISPENSE STATUS: NORMAL
EOSINOPHIL # BLD AUTO: 0 K/UL (ref 0–0.5)
EOSINOPHIL NFR BLD: 0.4 % (ref 0–8)
ERYTHROCYTE [DISTWIDTH] IN BLOOD BY AUTOMATED COUNT: 14.9 % (ref 11.5–14.5)
EST. GFR  (NO RACE VARIABLE): 41.2 ML/MIN/1.73 M^2
FERRITIN SERPL-MCNC: 234 NG/ML (ref 20–300)
FOLATE SERPL-MCNC: 5 NG/ML (ref 4–24)
GLUCOSE SERPL-MCNC: 87 MG/DL (ref 70–110)
HCT VFR BLD AUTO: 31.7 % (ref 37–48.5)
HGB BLD-MCNC: 10.1 G/DL (ref 12–16)
IMM GRANULOCYTES # BLD AUTO: 0.42 K/UL (ref 0–0.04)
IMM GRANULOCYTES NFR BLD AUTO: 3.7 % (ref 0–0.5)
IRON SERPL-MCNC: 124 UG/DL (ref 30–160)
LYMPHOCYTES # BLD AUTO: 1.3 K/UL (ref 1–4.8)
LYMPHOCYTES NFR BLD: 11.4 % (ref 18–48)
MCH RBC QN AUTO: 30.4 PG (ref 27–31)
MCHC RBC AUTO-ENTMCNC: 31.9 G/DL (ref 32–36)
MCV RBC AUTO: 96 FL (ref 82–98)
MONOCYTES # BLD AUTO: 0.9 K/UL (ref 0.3–1)
MONOCYTES NFR BLD: 7.9 % (ref 4–15)
NEUTROPHILS # BLD AUTO: 8.6 K/UL (ref 1.8–7.7)
NEUTROPHILS NFR BLD: 76.2 % (ref 38–73)
NRBC BLD-RTO: 0 /100 WBC
NUM UNITS TRANS PACKED RBC: NORMAL
OHS QRS DURATION: 142 MS
OHS QTC CALCULATION: 460 MS
PLATELET # BLD AUTO: 362 K/UL (ref 150–450)
PMV BLD AUTO: 11.3 FL (ref 9.2–12.9)
POTASSIUM SERPL-SCNC: 4.5 MMOL/L (ref 3.5–5.1)
PROT SERPL-MCNC: 6.4 G/DL (ref 6–8.4)
RBC # BLD AUTO: 3.32 M/UL (ref 4–5.4)
SATURATED IRON: 38 % (ref 20–50)
SODIUM SERPL-SCNC: 141 MMOL/L (ref 136–145)
TOTAL IRON BINDING CAPACITY: 324 UG/DL (ref 250–450)
TRANSFERRIN SERPL-MCNC: 219 MG/DL (ref 200–375)
VIT B12 SERPL-MCNC: 276 PG/ML (ref 210–950)
WBC # BLD AUTO: 11.33 K/UL (ref 3.9–12.7)

## 2024-07-23 PROCEDURE — 64448 NJX AA&/STRD FEM NRV NFS IMG: CPT

## 2024-07-23 PROCEDURE — 71000033 HC RECOVERY, INTIAL HOUR: Performed by: ORTHOPAEDIC SURGERY

## 2024-07-23 PROCEDURE — 82746 ASSAY OF FOLIC ACID SERUM: CPT | Performed by: HOSPITALIST

## 2024-07-23 PROCEDURE — 63600175 PHARM REV CODE 636 W HCPCS

## 2024-07-23 PROCEDURE — 36000710: Performed by: ORTHOPAEDIC SURGERY

## 2024-07-23 PROCEDURE — C1769 GUIDE WIRE: HCPCS | Performed by: ORTHOPAEDIC SURGERY

## 2024-07-23 PROCEDURE — P9016 RBC LEUKOCYTES REDUCED: HCPCS

## 2024-07-23 PROCEDURE — 0QPH34Z REMOVAL OF INTERNAL FIXATION DEVICE FROM LEFT TIBIA, PERCUTANEOUS APPROACH: ICD-10-PCS | Performed by: ORTHOPAEDIC SURGERY

## 2024-07-23 PROCEDURE — 37000008 HC ANESTHESIA 1ST 15 MINUTES: Performed by: ORTHOPAEDIC SURGERY

## 2024-07-23 PROCEDURE — 86920 COMPATIBILITY TEST SPIN: CPT

## 2024-07-23 PROCEDURE — 64448 NJX AA&/STRD FEM NRV NFS IMG: CPT | Mod: 59,LT,, | Performed by: ANESTHESIOLOGY

## 2024-07-23 PROCEDURE — 36430 TRANSFUSION BLD/BLD COMPNT: CPT

## 2024-07-23 PROCEDURE — 82607 VITAMIN B-12: CPT | Performed by: HOSPITALIST

## 2024-07-23 PROCEDURE — 27513 TREATMENT OF THIGH FRACTURE: CPT | Mod: 51,LT,GC, | Performed by: ORTHOPAEDIC SURGERY

## 2024-07-23 PROCEDURE — C1713 ANCHOR/SCREW BN/BN,TIS/BN: HCPCS | Performed by: ORTHOPAEDIC SURGERY

## 2024-07-23 PROCEDURE — 82728 ASSAY OF FERRITIN: CPT | Performed by: HOSPITALIST

## 2024-07-23 PROCEDURE — 92610 EVALUATE SWALLOWING FUNCTION: CPT

## 2024-07-23 PROCEDURE — 20670 REMOVAL IMPLANT SUPERFICIAL: CPT | Mod: 59,51,GC, | Performed by: ORTHOPAEDIC SURGERY

## 2024-07-23 PROCEDURE — 36415 COLL VENOUS BLD VENIPUNCTURE: CPT | Performed by: HOSPITALIST

## 2024-07-23 PROCEDURE — 36000711: Performed by: ORTHOPAEDIC SURGERY

## 2024-07-23 PROCEDURE — 94761 N-INVAS EAR/PLS OXIMETRY MLT: CPT

## 2024-07-23 PROCEDURE — 99223 1ST HOSP IP/OBS HIGH 75: CPT | Mod: 57,25,ICN, | Performed by: ORTHOPAEDIC SURGERY

## 2024-07-23 PROCEDURE — 11000001 HC ACUTE MED/SURG PRIVATE ROOM

## 2024-07-23 PROCEDURE — 27201423 OPTIME MED/SURG SUP & DEVICES STERILE SUPPLY: Performed by: ORTHOPAEDIC SURGERY

## 2024-07-23 PROCEDURE — 0QS936Z REPOSITION LEFT FEMORAL SHAFT WITH INTRAMEDULLARY INTERNAL FIXATION DEVICE, PERCUTANEOUS APPROACH: ICD-10-PCS | Performed by: ORTHOPAEDIC SURGERY

## 2024-07-23 PROCEDURE — 0QSC34Z REPOSITION LEFT LOWER FEMUR WITH INTERNAL FIXATION DEVICE, PERCUTANEOUS APPROACH: ICD-10-PCS | Performed by: ORTHOPAEDIC SURGERY

## 2024-07-23 PROCEDURE — 97535 SELF CARE MNGMENT TRAINING: CPT

## 2024-07-23 PROCEDURE — 83540 ASSAY OF IRON: CPT | Performed by: HOSPITALIST

## 2024-07-23 PROCEDURE — 80053 COMPREHEN METABOLIC PANEL: CPT | Performed by: HOSPITALIST

## 2024-07-23 PROCEDURE — 85025 COMPLETE CBC W/AUTO DIFF WBC: CPT | Performed by: HOSPITALIST

## 2024-07-23 PROCEDURE — 25000003 PHARM REV CODE 250: Performed by: NURSE ANESTHETIST, CERTIFIED REGISTERED

## 2024-07-23 PROCEDURE — 27506 TREATMENT OF THIGH FRACTURE: CPT | Mod: LT,GC,, | Performed by: ORTHOPAEDIC SURGERY

## 2024-07-23 PROCEDURE — 25000003 PHARM REV CODE 250: Performed by: HOSPITALIST

## 2024-07-23 PROCEDURE — 25000003 PHARM REV CODE 250

## 2024-07-23 PROCEDURE — 71000015 HC POSTOP RECOV 1ST HR: Performed by: ORTHOPAEDIC SURGERY

## 2024-07-23 PROCEDURE — 63600175 PHARM REV CODE 636 W HCPCS: Performed by: NURSE ANESTHETIST, CERTIFIED REGISTERED

## 2024-07-23 PROCEDURE — 63600175 PHARM REV CODE 636 W HCPCS: Performed by: ANESTHESIOLOGY

## 2024-07-23 PROCEDURE — 30233N1 TRANSFUSION OF NONAUTOLOGOUS RED BLOOD CELLS INTO PERIPHERAL VEIN, PERCUTANEOUS APPROACH: ICD-10-PCS | Performed by: INTERNAL MEDICINE

## 2024-07-23 PROCEDURE — 37000009 HC ANESTHESIA EA ADD 15 MINS: Performed by: ORTHOPAEDIC SURGERY

## 2024-07-23 DEVICE — LOCKING SCREW
Type: IMPLANTABLE DEVICE | Site: FEMUR | Status: FUNCTIONAL
Brand: T2 ALPHA

## 2024-07-23 DEVICE — IMPLANTABLE DEVICE: Type: IMPLANTABLE DEVICE | Site: FEMUR | Status: FUNCTIONAL

## 2024-07-23 DEVICE — ADVANCED LOCKING SCREW: Type: IMPLANTABLE DEVICE | Site: FEMUR | Status: FUNCTIONAL

## 2024-07-23 RX ORDER — ONDANSETRON HYDROCHLORIDE 2 MG/ML
INJECTION, SOLUTION INTRAVENOUS
Status: DISCONTINUED | OUTPATIENT
Start: 2024-07-23 | End: 2024-07-23

## 2024-07-23 RX ORDER — ESMOLOL HYDROCHLORIDE 10 MG/ML
INJECTION INTRAVENOUS
Status: DISCONTINUED | OUTPATIENT
Start: 2024-07-23 | End: 2024-07-23

## 2024-07-23 RX ORDER — LATANOPROST 50 UG/ML
1 SOLUTION/ DROPS OPHTHALMIC NIGHTLY
Status: DISCONTINUED | OUTPATIENT
Start: 2024-07-23 | End: 2024-07-26 | Stop reason: HOSPADM

## 2024-07-23 RX ORDER — SODIUM CHLORIDE 0.9 % (FLUSH) 0.9 %
10 SYRINGE (ML) INJECTION
Status: DISCONTINUED | OUTPATIENT
Start: 2024-07-23 | End: 2024-07-23 | Stop reason: HOSPADM

## 2024-07-23 RX ORDER — PHENYLEPHRINE HYDROCHLORIDE 10 MG/ML
INJECTION INTRAVENOUS
Status: DISCONTINUED | OUTPATIENT
Start: 2024-07-23 | End: 2024-07-23

## 2024-07-23 RX ORDER — FENTANYL CITRATE 50 UG/ML
25-200 INJECTION, SOLUTION INTRAMUSCULAR; INTRAVENOUS
Status: DISCONTINUED | OUTPATIENT
Start: 2024-07-23 | End: 2024-07-23 | Stop reason: HOSPADM

## 2024-07-23 RX ORDER — TRANEXAMIC ACID 100 MG/ML
INJECTION, SOLUTION INTRAVENOUS
Status: DISCONTINUED | OUTPATIENT
Start: 2024-07-23 | End: 2024-07-23

## 2024-07-23 RX ORDER — PROPOFOL 10 MG/ML
VIAL (ML) INTRAVENOUS
Status: DISCONTINUED | OUTPATIENT
Start: 2024-07-23 | End: 2024-07-23

## 2024-07-23 RX ORDER — ROPIVACAINE HYDROCHLORIDE 5 MG/ML
INJECTION, SOLUTION EPIDURAL; INFILTRATION; PERINEURAL
Status: COMPLETED | OUTPATIENT
Start: 2024-07-23 | End: 2024-07-23

## 2024-07-23 RX ORDER — LIDOCAINE HYDROCHLORIDE 20 MG/ML
INJECTION INTRAVENOUS
Status: DISCONTINUED | OUTPATIENT
Start: 2024-07-23 | End: 2024-07-23

## 2024-07-23 RX ORDER — OXYCODONE HYDROCHLORIDE 5 MG/1
5 TABLET ORAL EVERY 6 HOURS PRN
Status: DISCONTINUED | OUTPATIENT
Start: 2024-07-23 | End: 2024-07-24

## 2024-07-23 RX ORDER — ROPIVACAINE HYDROCHLORIDE 2 MG/ML
INJECTION, SOLUTION EPIDURAL; INFILTRATION; PERINEURAL CONTINUOUS
Status: DISCONTINUED | OUTPATIENT
Start: 2024-07-23 | End: 2024-07-26

## 2024-07-23 RX ORDER — CEFAZOLIN SODIUM 1 G/3ML
INJECTION, POWDER, FOR SOLUTION INTRAMUSCULAR; INTRAVENOUS
Status: DISCONTINUED | OUTPATIENT
Start: 2024-07-23 | End: 2024-07-23

## 2024-07-23 RX ORDER — DEXMEDETOMIDINE HYDROCHLORIDE 100 UG/ML
INJECTION, SOLUTION INTRAVENOUS
Status: DISCONTINUED | OUTPATIENT
Start: 2024-07-23 | End: 2024-07-23

## 2024-07-23 RX ORDER — FENTANYL CITRATE 50 UG/ML
INJECTION, SOLUTION INTRAMUSCULAR; INTRAVENOUS
Status: COMPLETED
Start: 2024-07-23 | End: 2024-07-23

## 2024-07-23 RX ORDER — MORPHINE SULFATE 2 MG/ML
2 INJECTION, SOLUTION INTRAMUSCULAR; INTRAVENOUS EVERY 4 HOURS PRN
Status: DISCONTINUED | OUTPATIENT
Start: 2024-07-23 | End: 2024-07-23

## 2024-07-23 RX ORDER — MIDAZOLAM HYDROCHLORIDE 1 MG/ML
.5-4 INJECTION, SOLUTION INTRAMUSCULAR; INTRAVENOUS
Status: DISCONTINUED | OUTPATIENT
Start: 2024-07-23 | End: 2024-07-23 | Stop reason: HOSPADM

## 2024-07-23 RX ORDER — HYDROMORPHONE HYDROCHLORIDE 1 MG/ML
0.2 INJECTION, SOLUTION INTRAMUSCULAR; INTRAVENOUS; SUBCUTANEOUS EVERY 4 HOURS PRN
Status: DISCONTINUED | OUTPATIENT
Start: 2024-07-23 | End: 2024-07-24

## 2024-07-23 RX ORDER — EPHEDRINE SULFATE 50 MG/ML
INJECTION, SOLUTION INTRAVENOUS
Status: DISCONTINUED | OUTPATIENT
Start: 2024-07-23 | End: 2024-07-23

## 2024-07-23 RX ORDER — MUPIROCIN 20 MG/G
OINTMENT TOPICAL
Status: DISCONTINUED | OUTPATIENT
Start: 2024-07-23 | End: 2024-07-23 | Stop reason: HOSPADM

## 2024-07-23 RX ORDER — FENTANYL CITRATE 50 UG/ML
INJECTION, SOLUTION INTRAMUSCULAR; INTRAVENOUS
Status: DISCONTINUED | OUTPATIENT
Start: 2024-07-23 | End: 2024-07-23

## 2024-07-23 RX ORDER — ROCURONIUM BROMIDE 10 MG/ML
INJECTION, SOLUTION INTRAVENOUS
Status: DISCONTINUED | OUTPATIENT
Start: 2024-07-23 | End: 2024-07-23

## 2024-07-23 RX ADMIN — SODIUM CHLORIDE, SODIUM GLUCONATE, SODIUM ACETATE, POTASSIUM CHLORIDE, MAGNESIUM CHLORIDE, SODIUM PHOSPHATE, DIBASIC, AND POTASSIUM PHOSPHATE: .53; .5; .37; .037; .03; .012; .00082 INJECTION, SOLUTION INTRAVENOUS at 09:07

## 2024-07-23 RX ADMIN — EPHEDRINE SULFATE 5 MG: 50 INJECTION INTRAVENOUS at 07:07

## 2024-07-23 RX ADMIN — EPHEDRINE SULFATE 5 MG: 50 INJECTION INTRAVENOUS at 08:07

## 2024-07-23 RX ADMIN — FENTANYL CITRATE 25 MCG: 50 INJECTION, SOLUTION INTRAMUSCULAR; INTRAVENOUS at 07:07

## 2024-07-23 RX ADMIN — ROCURONIUM BROMIDE 40 MG: 10 INJECTION, SOLUTION INTRAVENOUS at 07:07

## 2024-07-23 RX ADMIN — MEMANTINE 5 MG: 5 TABLET ORAL at 08:07

## 2024-07-23 RX ADMIN — EPHEDRINE SULFATE 10 MG: 50 INJECTION INTRAVENOUS at 08:07

## 2024-07-23 RX ADMIN — CEFAZOLIN 2 G: 330 INJECTION, POWDER, FOR SOLUTION INTRAMUSCULAR; INTRAVENOUS at 07:07

## 2024-07-23 RX ADMIN — SUGAMMADEX 200 MG: 100 INJECTION, SOLUTION INTRAVENOUS at 09:07

## 2024-07-23 RX ADMIN — AMLODIPINE BESYLATE 5 MG: 5 TABLET ORAL at 11:07

## 2024-07-23 RX ADMIN — QUETIAPINE FUMARATE 100 MG: 100 TABLET ORAL at 08:07

## 2024-07-23 RX ADMIN — ACETAMINOPHEN 1000 MG: 500 TABLET ORAL at 02:07

## 2024-07-23 RX ADMIN — EPHEDRINE SULFATE 10 MG: 50 INJECTION INTRAVENOUS at 07:07

## 2024-07-23 RX ADMIN — LATANOPROST 1 DROP: 50 SOLUTION OPHTHALMIC at 08:07

## 2024-07-23 RX ADMIN — TRANEXAMIC ACID 1000 MG: 100 INJECTION INTRAVENOUS at 08:07

## 2024-07-23 RX ADMIN — ESMOLOL HYDROCHLORIDE 10 MG: 100 INJECTION, SOLUTION INTRAVENOUS at 09:07

## 2024-07-23 RX ADMIN — MUPIROCIN: 20 OINTMENT TOPICAL at 06:07

## 2024-07-23 RX ADMIN — EPHEDRINE SULFATE 10 MG: 50 INJECTION INTRAVENOUS at 09:07

## 2024-07-23 RX ADMIN — APIXABAN 2.5 MG: 2.5 TABLET, FILM COATED ORAL at 08:07

## 2024-07-23 RX ADMIN — DEXMEDETOMIDINE 4 MCG: 100 INJECTION, SOLUTION, CONCENTRATE INTRAVENOUS at 07:07

## 2024-07-23 RX ADMIN — PHENYLEPHRINE HYDROCHLORIDE 200 MCG: 10 INJECTION INTRAVENOUS at 07:07

## 2024-07-23 RX ADMIN — SODIUM CHLORIDE: 0.9 INJECTION, SOLUTION INTRAVENOUS at 07:07

## 2024-07-23 RX ADMIN — FENTANYL CITRATE 25 MCG: 50 INJECTION, SOLUTION INTRAMUSCULAR; INTRAVENOUS at 06:07

## 2024-07-23 RX ADMIN — CEFAZOLIN 2 G: 2 INJECTION, POWDER, FOR SOLUTION INTRAMUSCULAR; INTRAVENOUS at 03:07

## 2024-07-23 RX ADMIN — ESMOLOL HYDROCHLORIDE 20 MG: 100 INJECTION, SOLUTION INTRAVENOUS at 07:07

## 2024-07-23 RX ADMIN — ROPIVACAINE HYDROCHLORIDE 10 ML: 5 INJECTION EPIDURAL; INFILTRATION; PERINEURAL at 06:07

## 2024-07-23 RX ADMIN — TIMOLOL MALEATE 1 DROP: 2.5 SOLUTION/ DROPS OPHTHALMIC at 09:07

## 2024-07-23 RX ADMIN — PROPOFOL 60 MG: 10 INJECTION, EMULSION INTRAVENOUS at 07:07

## 2024-07-23 RX ADMIN — LIDOCAINE HYDROCHLORIDE 60 MG: 20 INJECTION INTRAVENOUS at 07:07

## 2024-07-23 RX ADMIN — ONDANSETRON 4 MG: 2 INJECTION INTRAMUSCULAR; INTRAVENOUS at 09:07

## 2024-07-23 RX ADMIN — ROCURONIUM BROMIDE 20 MG: 10 INJECTION, SOLUTION INTRAVENOUS at 08:07

## 2024-07-23 RX ADMIN — FENTANYL CITRATE 25 MCG: 50 INJECTION INTRAMUSCULAR; INTRAVENOUS at 06:07

## 2024-07-23 RX ADMIN — APIXABAN 2.5 MG: 2.5 TABLET, FILM COATED ORAL at 11:07

## 2024-07-23 RX ADMIN — SENNOSIDES AND DOCUSATE SODIUM 1 TABLET: 50; 8.6 TABLET ORAL at 11:07

## 2024-07-23 RX ADMIN — ACETAMINOPHEN 1000 MG: 500 TABLET ORAL at 08:07

## 2024-07-23 RX ADMIN — PROPOFOL 30 MG: 10 INJECTION, EMULSION INTRAVENOUS at 07:07

## 2024-07-23 RX ADMIN — Medication: at 10:07

## 2024-07-23 RX ADMIN — MEMANTINE 5 MG: 5 TABLET ORAL at 11:07

## 2024-07-23 RX ADMIN — GLYCOPYRROLATE 0.2 MG: 0.2 INJECTION, SOLUTION INTRAMUSCULAR; INTRAVENOUS at 08:07

## 2024-07-23 NOTE — PROGRESS NOTES
Preop complete. Daughter Gertrudis at bedside answering questions. Consents verified.  called to pray with family before surgery. Scapular placed in patient gown pocket.

## 2024-07-23 NOTE — ED NOTES
Gary catheter placed by RN prior to transport upstairs, sheets free of urine, urinalysis obtained.

## 2024-07-23 NOTE — PROGRESS NOTES
[facility] - Surgery  Initial Discharge Assessment       Primary Care Provider: No, Primary Doctor    Admission Diagnosis: Trauma [T14.90XA]  Fall [W19.XXXA]  Pre-op evaluation [Z01.818]  Closed supracondylar fracture of left femur, initial encounter [S72.452A]    Admission Date: 7/22/2024  Expected Discharge Date: 7/26/2024    Transition of Care Barriers: Mobility    Payor: MEDICARE / Plan: MEDICARE PART A & B / Product Type: Government /     Extended Emergency Contact Information  Primary Emergency Contact: Mariza Wells  Address: [address] [place], [place] [address] [facility]  Home Phone: [phone]  Relation: Daughter    Discharge Plan A: Home Health, Hospice/home       No Pharmacies Listed    Initial Assessment (most recent)       Adult Discharge Assessment - 07/23/24 1300          Discharge Assessment    Assessment Type Discharge Planning Assessment     Confirmed/corrected address, phone number and insurance Yes     Confirmed Demographics Correct on Facesheet     Source of Information family;health record     If unable to respond/provide information was family/caregiver contacted? Yes     Contact Name/Number albert Vargas @[phone]     Does patient/caregiver understand observation status Yes   pt is inpatient    Communicated KONSTANTIN with patient/caregiver No     Reason For Admission fall     People in Home facility resident     Facility Arrived From: [facility]     Do you expect to return to your current living situation? No   family does not want patient to return to [facility]    Do you have help at home or someone to help you manage your care at home? Yes     Who are your caregiver(s) and their phone number(s)? albert Vargas @[phone]     Walking or Climbing Stairs Difficulty yes     Walking or Climbing Stairs ambulation difficulty, requires equipment;ambulation difficulty, assistance 1 person;transferring difficulty, assistance 1 person      Mobility Management wheel chair /hospital  bed     Dressing/Bathing Difficulty yes     Equipment Currently Used at Home wheelchair;walker, rolling;3-in-1 commode     Readmission within 30 days? No     Patient currently being followed by outpatient case management? No     Do you take prescription medications? Yes     Do you have prescription coverage? Yes     Coverage medicare a and b     Do you have any problems affording any of your prescribed medications? No     Is the patient taking medications as prescribed? yes     Who is going to help you get home at discharge? Mariza Gold ,daughter @[phone]     How do you get to doctors appointments? family or friend will provide     Are you on dialysis? No     Do you take coumadin? No     Discharge Plan A Home Health;Hospice/home     DME Needed Upon Discharge  hospital bed;oxygen     Discharge Plan discussed with: Adult children     Transition of Care Barriers Mobility        Physical Activity    On average, how many days per week do you engage in moderate to strenuous exercise (like a brisk walk)? 0 days     On average, how many minutes do you engage in exercise at this level? 0 min        Financial Resource Strain    How hard is it for you to pay for the very basics like food, housing, medical care, and heating? Somewhat hard        Housing Stability    In the last 12 months, was there a time when you were not able to pay the mortgage or rent on time? No     At any time in the past 12 months, were you homeless or living in a shelter (including now)? No        Transportation Needs    Has the lack of transportation kept you from medical appointments, meetings, work or from getting things needed for daily living? No        Food Insecurity    Within the past 12 months, you worried that your food would run out before you got the money to buy more. Never true     Within the past 12 months, the food you bought just didn't last and you didn't have money to get more. Never  true        Social Isolation    How often do you feel lonely or isolated from those around you?  Never        Alcohol Use    Q2: How many drinks containing alcohol do you have on a typical day when you are drinking? Patient does not drink     Q3: How often do you have six or more drinks on one occasion? Never        Utilities    In the past 12 months has the electric, gas, oil, or water company threatened to shut off services in your home? No        Health Literacy    How often do you need to have someone help you when you read instructions, pamphlets, or other written material from your doctor or pharmacy? Never        OTHER    Name(s) of People in Home Mariza Gold ,daughter @[phone]

## 2024-07-23 NOTE — PLAN OF CARE
Seen on floor post op with family at bedside. Pain controlled so far with ropivicaine in place. Was at hospice at baseline, could transfer to toilet but was mostly wheelchair/bed bound and they are very realistic they said about long term goals, and pain control major goal of surgery and was on hospice prior to this.   B12/folate/ferritin okay with hg 8 on admit now 10 with 1 U priro to surgery.   Recs for wbat, romat post op but above baseline.  Eliquis for 35 days post op end date aug 28.  Cr 1.7 --1.2 now  Bp 150s-170s systolic, on norvasc now, fam reports was on lisinopril before also but stopped last month by hospice cmpany to see if could stay on just 1 BP med but last time they tried her BP shot up so byron need to watch to see if may need to resume if stays up post op once pain controlled.  Was on timolol + lantanoprost and rseumed lantanoprost as was not started on admit.  They report after hip surg 4 years ago had afib that self resolved, took eliquis for a bit but never came back so stopped awhile ago. In NSR now on admit and will watch in case of any change in rhythm. Just on asa now for ppx.   Has some dysphagia at home rosie to water at times but not restricting diet, they use flavored water as were told it will help her swallow better to have some flavoring in it and speech consulted now to assesse if eneds any restrictions to diet and regular now, they sometimes will finely chop it at home. PT/OT tomorrow, likely plan for back with hospice long term

## 2024-07-23 NOTE — ASSESSMENT & PLAN NOTE
-Orthopedic surgery consulted and plan to take patient to the OR tomorrow. Pt. NPO at midnight for surgical repair of distal femur fracture  -Pain control with multimodal pain regimen with scheduled Tylenol and Lyrica 75 mg po nightly. Oxycodone PRN. IV morphine PRN for pain not controlled by PO medications  -DVT prophylaxis pre-op with TEDs/SCDs.   -Plan to monitor daily electrolytes and H/H post-op.   -Consult  and case management to assist with discharge planning for this patient after surgery.        Preoperative Cardiac evaluation  Cardiovascular Risk Assessment:  Non-emergent surgery.  No active cardiac problems (such as unstable angina, decompensated heart failure, significant uncontrolled arrhythmias or severe valvular disease).  Intermediate risk surgery.  Functional Status: She IS NOT able to climb a flight of stairs (> 4 METS) with no CP or SOB  Her revised cardiac risk index is 1.     1 pt Each: Ischemic Heart Disease, Cerebrovascular Disease,                     CHF, DM, Creatinine > 2           Other Issues: Advanced age and dementia put pt. At higher risk, but she has no signs of acute decompensation and is stable for surgery without further cardiac testing needed

## 2024-07-23 NOTE — ASSESSMENT & PLAN NOTE
-Cr 1.7 on presentation, baseline unclear. IV fluids ordered while NPO, monitor kidney function daily while admitted for surgery

## 2024-07-23 NOTE — ANESTHESIA POSTPROCEDURE EVALUATION
Anesthesia Post Evaluation    Patient: Barby Burrell    Procedure(s) Performed: Procedure(s) (LRB):  ORIF, FRACTURE, DISTAL FEMUR (Left)    Final Anesthesia Type: general      Patient location during evaluation: PACU  Patient participation: Yes- Able to Participate  Level of consciousness: awake and alert  Post-procedure vital signs: reviewed and stable  Pain management: adequate  Airway patency: patent    PONV status at discharge: No PONV  Anesthetic complications: no      Cardiovascular status: blood pressure returned to baseline  Respiratory status: unassisted  Hydration status: euvolemic  Follow-up not needed.              Vitals Value Taken Time   /78 07/23/24 1100   Temp 36.6 °C (97.8 °F) 07/23/24 1000   Pulse 89 07/23/24 1100   Resp 14 07/23/24 1100   SpO2 94 % 07/23/24 1100         Event Time   Out of Recovery 07/23/2024 10:30:00         Pain/Bk Score: Pain Rating Prior to Med Admin: 4 (7/23/2024 10:07 AM)  Bk Score: 9 (7/23/2024 10:30 AM)

## 2024-07-23 NOTE — CONSULTS
[facility] - Emergency Dept  Orthopedics  Consult Note    Patient Name: Barby Burrell  MRN: [id number]  Admission Date: 7/22/2024  Hospital Length of Stay: 0 days  Attending Provider: Charlene Greene MD  Primary Care Provider: No primary care provider on file.        Inpatient consult to Orthopedic Surgery  Consult performed by: SELAM Woo MD  Consult ordered by: Shade Mckeon MD        Subjective:     Principal Problem:Closed fracture of left distal femur    Chief Complaint:   Chief Complaint   Patient presents with    Leg Injury     Arrived via ems from [address] [place] [address] with c/o deformity to left upper leg, denies pt with fall or trauma, x-ray done today with distal femur fracture diagnosed, pt oriented to self only at baseline        HPI: Barby Burrell is a 89 y.o. female with PMH significant for R hip fx 5 years ago s/p IMN and L hip fx 4 years ago s/p short IMN, HTN and dementia presenting with left leg pain. Patient lives in a care facility and on Friday morning her daughter noticed some left distal femur swelling. Patient was also having left thigh pain. Due to persistent left thigh pain over the weekend despite medications, she had an XR done today at the facility which showed a left distal femur fracture. Around 1 week ago, the patient was also having some right knee pain. The family is unsure of how this fracture happened and are not aware of any recents falls or trauma. Patient moved to her current care facility around 1 month ago as she was previously living with her daughters who were providing care. She does have a history of falls. Patient denies numbness and tingling. She has some right knee pain today as well. She is nonambulatory at baseline. Does not take any anticoagulation at baseline.             No past medical history on file.    No past surgical history on file.    Review of patient's allergies indicates:  Not on File    Current Facility-Administered  Medications   Medication    0.9%  NaCl infusion (for blood administration)    acetaminophen tablet 650 mg    dextrose 10% bolus 125 mL 125 mL    dextrose 10% bolus 250 mL 250 mL    glucagon (human recombinant) injection 1 mg    glucose chewable tablet 16 g    glucose chewable tablet 24 g    melatonin tablet 6 mg    naloxone 0.4 mg/mL injection 0.02 mg    ondansetron injection 4 mg    oxyCODONE immediate release tablet 10 mg    oxyCODONE immediate release tablet 5 mg    prochlorperazine injection Soln 5 mg    sodium chloride 0.9% flush 10 mL     No current outpatient medications on file.     Family History    None       Tobacco Use    Smoking status: Not on file    Smokeless tobacco: Not on file   Substance and Sexual Activity    Alcohol use: Not on file    Drug use: Not on file    Sexual activity: Not on file     Review of Systems   Unable to perform ROS: Dementia     Objective:     Vital Signs (Most Recent):  Temp: 98.7 °F (37.1 °C) (07/22/24 1741)  Pulse: 62 (07/22/24 1741)  Resp: 18 (07/22/24 2030)  BP: 121/83 (07/22/24 1741)  SpO2: 95 % (07/22/24 1440) Vital Signs (24h Range):  Temp:  [98.7 °F (37.1 °C)-98.8 °F (37.1 °C)] 98.7 °F (37.1 °C)  Pulse:  [62-63] 62  Resp:  [18-19] 18  SpO2:  [95 %] 95 %  BP: (121-123)/(83-86) 121/83           There is no height or weight on file to calculate BMI.    No intake or output data in the 24 hours ending 07/22/24 2200     Ortho/SPM Exam  General:  no acute distress, appears stated age   Neuro: alert and oriented x3  Psych: normal mood  Head: normocephalic, atraumatic.  Eyes: no scleral icterus  Mouth: moist mucous membranes  Cardiovascular: extremities warm and well perfused  Lungs: breathing comfortably, equal chest rise bilat  Skin: clean, dry, intact (any exceptions noted in below musculoskeletal exam)    MSK:  RUE:  - Skin intact throughout, no open wounds  - No swelling  - No ecchymosis, erythema, or signs of cellulitis  - NonTTP throughout  - AROM and PROM of the  shoulder, elbow, wrist, and hand intact without pain  - Axillary/AIN/PIN/Radial/Median/Ulnar Nerves assessed in isolation without deficit  - SILT throughout  - Compartments soft  - Radial artery palpated   - Capillary Refill <3s    LUE:  - Skin intact throughout, no open wounds  - No swelling  - No ecchymosis, erythema, or signs of cellulitis  - NonTTP throughout  - AROM and PROM of the shoulder, elbow, wrist, and hand intact without pain  - Axillary/AIN/PIN/Radial/Median/Ulnar Nerves assessed in isolation without deficit  - SILT throughout  - Compartments soft  - Radial artery palpated   - Capillary Refill <3s    RLE:  - Skin intact throughout, no open wounds  - No swelling  - Ecchymosis over medial knee  - NonTTP throughout  - AROM and PROM of the hip, ankle, and foot intact without pain  - Pain with ROM of knee  - TA/EHL/Gastroc/FHL assessed in isolation without deficit  - SILT throughout  - Compartments soft  - DP palpated  - Capillary Refill <3s    LLE:  - Skin intact throughout, no open wounds  - Moderate swelling to distal femur  - Ecchymosis over lateral aspect of distal femur and distal lower leg  - TTP at distal femur  - able to wiggle toes  - AROM/PROM of ankle and foot intact without pain  - ROM of knee and hip not assessed 2/2 known fracture  - TA/EHL/Gastroc/FHL assessed in isolation without deficit  - SILT throughout  - Compartments soft  - DP palpated  - Capillary Refill <3s      Spine/pelvis/axial body:  No pain with compression of pelvis  No chest wall or abdominal tenderness         Significant Labs: A1C:   Recent Labs   Lab 07/22/24 1923   HGBA1C 5.4     CBC:   Recent Labs   Lab 07/22/24  1555   WBC 10.70   HGB 8.4*   HCT 26.9*        CMP:   Recent Labs   Lab 07/22/24  1555      K 4.7      CO2 23   *   BUN 50*   CREATININE 1.7*   CALCIUM 8.4*   ANIONGAP 7*     Coagulation:   Recent Labs   Lab 07/22/24 1923   LABPROT 10.3   INR 0.9     Prealbumin:   Recent Labs   Lab  07/22/24 1923   PREALBUMIN 22       All pertinent labs within the past 24 hours have been reviewed.    Significant Imaging: I have reviewed and interpreted all pertinent imaging results/findings.  XR L femur: Comminuted and Displaced Distal femur fracture  Assessment/Plan:     * Closed fracture of left distal femur  Barby Burrell is a 89 y.o. female with a PMHx of R hip fx 5 years ago s/p IMN and L hip fx 4 years ago s/p short IMN, HTN and dementia presenting with left distal femur fracture, closed, NVI. They take no anticoagulation at home. They were nonambulatory prior to this injury.    -Admitted to medicine for pre-operative clearance and medical evaluation  -To OR 7/22/24 for operative fixation of left distal femur fracture  -Pt marked, booked, and consented for surgery  -DVT PPx: Hold anticoagulation  -Abx: Preop abx ordered  -Labs: As above  -Bed rest, ire[place], NPO at midnight  -Iv: ordered for contralateral arm    Procedure Note: Left Tibial Skeletal Traction Pin  After time out was performed and patient ID, side, and site were verified, the left proximal tibia was sterilly prepped in the standard fashion.  10 mL's of 1% xylocaine were injected into the medial and 10 mL's into the lateral aspect of the proximal tibia. After adequate analgesia was obtained, a 2.4 K wire was placed across the proximal tibia from lateral to medial. Post procedure XR confirmed appropriate placement of the K wire. Procedure was tolerated well. Blood loss was minimal.                    SELAM Woo MD  Orthopedics  [facility] - Emergency Dept

## 2024-07-23 NOTE — ASSESSMENT & PLAN NOTE
-Hgb 8.4 on presentation, baseline unclear. Anemia work-up ordered. 1 unit ordered pre-op per ortho, monitor CBC daily while admitted for surgery

## 2024-07-23 NOTE — OP NOTE
OPERATIVE NOTE    DATE OF PROCEDURE:  07/23/2024    PREOPERATIVE DIAGNOSIS:   Left femoral shaft fracture, closed, displaced, initial encounter   Left intra-articular distal femur supracondylar fracture with intracondylar extension, closed, nondisplaced  Dementia    POSTOPERATIVE DIAGNOSIS:   Left femoral shaft fracture, closed, displaced, initial encounter   Left intra-articular distal femur supracondylar fracture with intracondylar extension, closed, nondisplaced  Dementia    PROCEDURE:   Open reduction internal fixation left femoral shaft fracture with retrograde intramedullary nail   Open reduction internal fixation left distal femur supracondylar fracture, with intra-articular extension utilizing independent screw  Removal of superficial wire left proximal tibia    SURGEON:   Kam Navarro MD    ASSISTANT:    Carlos Romero MD    ANESTHESIA:   General    EBL:    200 mL    COMPLICATIONS:  none    IMPLANTS:   Zita  Distal femur  6.5 mm partially threaded cannulated screw, x1  Femoral shaft  T2 retrograde femoral nail, 13 x 200 mm  5.0 mm advanced distal interlocking screws, x4  5.0 mm proximal interlocking screw, x2    SPECIMENS:   none    INDICATIONS FOR PROCEDURE:  89-year-old female, nursing home resident with dementia, unwitnessed trauma with resultant left distal femur fracture.    Comminuted shaft component with nondisplaced intra-articular split.  He came to emergency room where injury was identified.  Proximal tibia traction pin placed by resident on-call team.  Patient was admitted to the hospital, hospital Medicine assistant with medical comanagement     Dementia, nursing home resident   Non ambulator, bed-bound due to dementia and prior bilateral hip fractures   Left-sided short intramedullary hip nail in place  Denies prior heart attack, stroke, blood clot, cancer, diabetes, tobacco use    Counseled patient family members on diagnosis left femur fracture involving both the shaft distal  intra-articular surface.  Discussed both non operative operative management options.  Non operative management would result in persistent malalignment, malunion, pain, poor mobility.  Discussed operative intervention the form open reduction internal fixation.  This could either be performed with a retrograde intramedullary nail, independent screws, lateral locking plate or combination of the above.  Plan would be to make intraoperative decision on how to utilize this combination of hardware.  Hopefully this would provide improved alignment, stability, both early and long-term outcome.    The risks, benefits, and alternatives to surgery were discussed with the patient and/or family.    Specific risks discussed included, but were not limited to:  Knee pain, stiffness failure of fixation, erica implant fracture, damage to nearby structures, including neurovascular structures leading to loss of function or loss of limb, bleeding, need for blood transfusion, pain, stiffness, scarring, numbness, tingling, weakness, compartment syndrome, malunion/nonunion, hardware failure, hardware prominence, infection, need for multiple staged procedures, prolonged antibiotics, iatrogenic fracture, heterotopic ossification, arthritis, a variety of medical complications including but not limited to heart attack, stroke, deep venous thrombosis, pulmonary embolism, prolonged hospitalization, prolonged intubation, and death.   Patient and/or family expressed an understanding and desires to proceed with surgery.   All questions were answered.  No guarantees were implied or stated.  Informed consent was obtained.      OPERATIVE PROCEDURE:  Patient met in the preoperative hold area and the correct site and side of surgery being the left lower extremity were marked and verified.  Patient brought back to the operative suite.  General anesthesia smoothly induced.  Patient transferred over to operative table.  Placed in supine position. All bony  prominences were appropriately padded.  Patient received 2 g Ancef for preoperative antibiotics.  1G TXA was given aid in hemostasis.      Time-out was performed verifying the correct patient, site/side of surgery, surgical consent, radiographs as applicable, preop antibiotics, necessary equipment, anticipated blood loss, length of procedure, postoperative disposition.    The previously placed proximal tibial traction pin was cleaned and removed.    The left lower extremity was then prepped and draped in normal sterile fashion.    We placed the operative limb over a radiolucent triangle, utilized bumps, traction, reduction maneuvers and were able to perform a closed reduction of the femoral shaft component.      We then turned our attention to open reduction internal fixation of the left distal femur supracondylar fracture with intra-articular extension.  CT scan indicated a nondisplaced intra-articular split.  We planned for retrograde intramedullary nail for treatment of the shaft fracture so we wanted to prevent fracture displacement with use of an independent screw.  We used fluoroscopic guidance.  We made a small stab incision on the lateral aspect of the distal femur, placed a guidewire for cannulated screw outside the path of our planned nail.  Wire was advanced the far cortex, measured and a 6.5 mm partially-threaded cannulated screw was placed over the wire.    We then turned our attention to open reduction internal fixation of the femoral shaft fracture.  We are able to recreate our traction and reduction maneuvers to obtain a closed reduction.  He launch distal to the patella, split the patella tendon.  Entered the joint.  Placed a guidewire in appropriate starting position based on fluoroscopy.  Then used soft tissue guide and opening Reamer.  We then advanced a ball-tipped guidewire into the distal femur, guided it up across the fracture into the femoral shaft.  We stopped at the level of the hip  implant and measured wire while performing our reduction maneuvers.  We chose a 200 mm short nail.  We passed a 14 mm Reamer wants to ensure that the implant would fit.  Nail was placed on the insertion handle, inserted over the ball-tipped guidewire.  Fluoroscopy confirmed appropriate seating of the nail as well as appropriate fracture reduction.      We then turned our attention to distal interlocking screw placement.  We used all advanced interlocking screws.  We used outrigger guide, cannula system.  We placed the 2 oblique screws through small stab incisions.  Fluoroscopy confirmed appropriate screw placement.  We then turned our attention to placement of proximal interlocking screws.  We are able to use the outrigger guide.  We placed a lateral to medial interlocking screw utilizing the cannula and small stab incision.  At this point we assessed the fracture stability and amount of fixation that we had distally as well as the intervening bone between the 2 implants.  We planned for fixation with the intramedullary nail and chose to avoid further plate fixation to avoid the further surgical time, trauma and blood loss that would be associated with the combined procedure.  We then placed a 2nd proximal interlocking screw utilizing outrigger guide, cannula.    We then returned to the distal segment.  We placed 2 lateral to medial screws through the outrigger guide.  These were both advanced distal interlocking screws.    Fluoroscopy confirmed appropriate fracture reduction and hardware placement.    Wounds irrigated with saline.  Hemostasis achieved as needed with electrocautery.  Fascia closed with 0 Vicryl.  Deep tissue closed with 2-0 Vicryl.  Subcutaneous tissue closed with 2-0 Vicryl.  Skin closed with staples.  Xeroform, gauze, Tegaderm, cast padding, Ace wrap dressing applied.    At the conclusion of procedure the patient had soft and compressible compartments, brisk cap refill, palpable DP/PT pulse in the  operative extremity.    Prior to final closure all counts were confirmed to be correct.  Patient tolerated the procedure well without any complications, was awoken from anesthesia, transferred PACU for further recovery.    POSTOPERATIVE PLAN:  89-year-old female, nursing home resident with dementia, unwitnessed trauma with resultant left distal femur fracture.      7.23.24 - ORIF/IMN L distal femur fx    Abx x24hrs  Eliquis x 4 wks for DVT prophylaxis  WBAT, ROMAT LLE    Ca, Vit D, boost  Fragility fx clinic referral  Hospitalist comanagement    X-rays at subsequent followups:  L knee    Follow-up postop 2 weeks, 6 weeks, 3 months, 6 months, 1 year    =====================  Kam Navarro MD  Orthopaedic Surgery

## 2024-07-23 NOTE — ANESTHESIA PREPROCEDURE EVALUATION
[facility]  Anesthesia Pre-Operative Evaluation   07/22/2024        Patient Name: Barby Burrell  YOB: 1927  MRN: [id number]    Subjective  Pre-operative evaluation for Procedure(s) (LRB):  ORIF, FRACTURE, DISTAL FEMUR - OLD JOSE FLAT TOP, TRIANGLE, SUPINE, NIKKI, LATERAL DISTAL FEMUR PLATES, SHORT RETROGRADE NAIL (Left)    Barby Burrell is a 89 y.o. female w/ a significant PMHx of: of prior hip sx in which she developed afib during the hospitalization, she was on eliquis for 2 years before it was discontinued. She presents for above following a fall. Family at bedside denies any cardiac or respiratory issues.     Consent was obtained at bedside with patients daughter 2/2 to patients mental status (confusion).     No past medical history on file.    Current Inpatient Medications:       No current facility-administered medications on file prior to encounter.     No current outpatient medications on file prior to encounter.       No past surgical history on file.    Social History:  Tobacco Use: Not on file       Alcohol Use: Not on file       Objective    Vital Signs Range:  BMI Readings from Last 1 Encounters:   No data found for BMI       Temp:  [37.1 °C (98.7 °F)-37.1 °C (98.8 °F)]   Pulse:  [62-63]   Resp:  [18-19]   BP: (121-123)/(83-86)   SpO2:  [95 %]        Significant Labs:        Component Value Date/Time    WBC 10.70 07/22/2024 1555    HGB 8.4 (L) 07/22/2024 1555    HCT 26.9 (L) 07/22/2024 1555     07/22/2024 1555     07/22/2024 1555    K 4.7 07/22/2024 1555     07/22/2024 1555    CO2 23 07/22/2024 1555     (H) 07/22/2024 1555    BUN 50 (H) 07/22/2024 1555    CREATININE 1.7 (H) 07/22/2024 1555    MG 2.5 07/22/2024 1923    PHOS 3.2 07/22/2024 1923    CALCIUM 8.4 (L) 07/22/2024 1555    INR 0.9 07/22/2024 1923    HGBA1C 5.4 07/22/2024 1923        Please see Results Review for additional labs.     Diagnostic Studies: All relevant studies,  reviewed.    EKG:   No results found for this or any previous visit.    ECHO:  No results found for this or any previous visit.        Pre-op Assessment    I have reviewed the NPO Status.   I have reviewed the Medications.     Review of Systems  Anesthesia Hx:  No problems with previous Anesthesia   History of prior surgery of interest to airway management or planning:            Denies Personal Hx of Anesthesia complications.                    Social:  Non-Smoker, No Alcohol Use       Cardiovascular:      Denies Hypertension.       Denies Angina.                                  Pulmonary:       Denies Shortness of breath.                  Renal/:   Denies Chronic Renal Disease.                Neurological:    Denies CVA.                                        Physical Exam    Airway:  Mallampati: II   Mouth Opening: Small, but > 3cm  TM Distance: Normal  Tongue: Normal    Dental:  Partial Dentures        Anesthesia Plan  Type of Anesthesia, risks & benefits discussed:    Anesthesia Type: Gen ETT, Regional  Intra-op Monitoring Plan: Standard ASA Monitors  Post Op Pain Control Plan: multimodal analgesia and IV/PO Opioids PRN  Induction:  IV  Airway Plan: Direct and Video, Post-Induction  Informed Consent: Informed consent signed with the Patient representative and all parties understand the risks and agree with anesthesia plan.  All questions answered. Patient consented to blood products? Yes  ASA Score: 3  Day of Surgery Review of History & Physical: H&P Update referred to the surgeon/provider.  Anesthesia Plan Notes: Patient is listed as DNR, had discussion at bedside with patients daughter Mariza Shahid about what that means in regards to agreeing for surgery. She understood that agreeing to surgery would likely entail endotracheal intubation, as well as the potential use of pharmacologic agents to counteract hemodynamic changes. She expressed that they are okay with all measures required intraoperatively,  with the exception that should she develop a cardiac event requiring chest compressions that the team abstain from performing compressions.     Ready For Surgery From Anesthesia Perspective.     .

## 2024-07-23 NOTE — ANESTHESIA PROCEDURE NOTES
Intubation    Date/Time: 7/23/2024 7:24 AM    Performed by: Michael Saini CRNA  Authorized by: Alex Hutchinson III, MD    Intubation:     Induction:  Intravenous    Intubated:  Postinduction    Mask Ventilation:  Easy mask    Attempts:  1    Attempted By:  CRNA    Method of Intubation:  Video laryngoscopy    Blade:  Alvarado 3    Laryngeal View Grade: Grade IIA - cords partially seen      Difficult Airway Encountered?: No      Complications:  None    Airway Device:  Oral endotracheal tube    Airway Device Size:  7.0    Style/Cuff Inflation:  Cuffed (inflated to minimal occlusive pressure)    Tube secured:  20    Secured at:  The teeth    Placement Verified By:  Capnometry    Complicating Factors:  Anterior larynx and small mouth    Findings Post-Intubation:  BS equal bilateral and atraumatic/condition of teeth unchanged

## 2024-07-23 NOTE — ACP (ADVANCE CARE PLANNING)
Advance Care Planning     Date: 07/23/2024    Code Status  In light of the patients advanced and life limiting illness,I engaged the the family in a voluntary conversation about the patient's preferences for care  at the very end of life. The patient wishes to have a natural, peaceful death.  the patient was on hospice prior to admit. The goals of care are pain control and likely to return to hospice with advanced age. The family reports they are very reasonable in her long term goals of care. Along those lines, the patient does not wish to have CPR or other invasive treatments performed when her heart and/or breathing stops. I communicated to the family that a DNR order would be placed in her medical record to reflect this preference.    A total of 10 min was spent on advance care planning, goals of care discussion, emotional support, formulating and communicating prognosis and exploring burden/benefit of various approaches of treatment. This discussion occurred on a fully voluntary basis with the verbal consent of the patient and/or family.

## 2024-07-23 NOTE — PLAN OF CARE
07/23/24 1315   Post-Acute Status   Post-Acute Authorization Placement   Discharge Delays None known at this time  (patient's family does not want patient to discharge back to [facility])

## 2024-07-23 NOTE — HPI
89 yo F with PMHx dementia and HLD who presents to the ED from care facility with L leg pain x a few days. Pt. Alert to person and occasionally polace, but she is unable to provide history. All history obtained per daughter at bedside. Per daughter, pt. Was last seen normal Thursday evening when her sister visited her. On Friday morning she received a call stating that the patient had been complaing of L leg pain and had a swollen knee. Pt. Currently on hospice, but given the apparent acute injury, an X-ray was obtained which revealed a distal femur fracture. Case was discussed with family who decided to send pt. To ED for orthopedic surgery evaluation and potential surgery.    Per family, at baseline pt. Is mostly wheelchair bound. She previously lived in [place] with 24/hr care givers until family decided recently to move her to a healthcare facility with hospice care closer to their home just a few weeks ago.

## 2024-07-23 NOTE — TRANSFER OF CARE
"Anesthesia Transfer of Care Note    Patient: Barby Burrell    Procedure(s) Performed: Procedure(s) (LRB):  ORIF, FRACTURE, DISTAL FEMUR (Left)    Patient location: PACU    Transport from OR: Transported from OR on 6-10 L/min O2 by face mask with adequate spontaneous ventilation    Post pain: adequate analgesia    Post assessment: no apparent anesthetic complications and tolerated procedure well    Post vital signs: stable    Level of consciousness: responds to stimulation and lethargic    Nausea/Vomiting: no nausea/vomiting    Complications: none    Transfer of care protocol was followed      Last vitals: Visit Vitals  BP (!) 131/67 (BP Location: Left arm)   Pulse 77   Temp 36.7 °C (98 °F) (Temporal)   Resp 16   Ht 5' 4" (1.626 m)   Wt 72.6 kg (160 lb)   SpO2 98%   Breastfeeding No   BMI 27.46 kg/m²     "

## 2024-07-23 NOTE — PT/OT/SLP EVAL
Speech Language Pathology Evaluation  Bedside Swallow    Patient Name:  Barby Burrell   MRN:  [id number]  Admitting Diagnosis: Closed fracture of left distal femur    Recommendations:                 General Recommendations:  Follow-up not indicated  Diet recommendations:  Regular Diet - IDDSI Level 7, Thin liquids - IDDSI Level 0   Aspiration Precautions: 1 bite/sip at a time, Alternating bites/sips, Assistance with meals, Check for pocketing/oral residue, Feed only when awake/alert, Frequent oral care, HOB to 90 degrees, No straws, Remain upright 30 minutes post meal, and Strict aspiration precautions   General Precautions: Standard, aspiration  Communication strategies:  none    Assessment:     Barby Burrell is a 89 y.o. female with an SLP diagnosis of dysphagia. Provided family with aspiration precautions and safe swallow strategies.   History:     No past medical history on file.    No past surgical history on file.    HPI: 89 yo F with PMHx dementia and HLD who presents to the ED from care facility with L leg pain x a few days. Pt. Alert to person and occasionally polace, but she is unable to provide history. All history obtained per daughter at bedside. Per daughter, pt. Was last seen normal Thursday evening when her sister visited her. On Friday morning she received a call stating that the patient had been complaing of L leg pain and had a swollen knee. Pt. Currently on hospice, but given the apparent acute injury, an X-ray was obtained which revealed a distal femur fracture. Case was discussed with family who decided to send pt. To ED for orthopedic surgery evaluation and potential surgery.     Prior Intubation HX:  7/23    Modified Barium Swallow: none on file     Chest X-Rays: Impression:   Interstitial findings are accentuated by habitus and shallow inspiratory effort.  Underlying edema is a consideration.  Correlation however is needed.     Prior diet: regular and thins.    Subjective     Pt seen  with family at bedside.     Pain/Comfort:  Pain Rating 1: 0/10    Respiratory Status: Room air    Objective:     Oral Musculature Evaluation  Oral Musculature: WNL  Dentition: scattered dentition  Secretion Management: adequate  Mucosal Quality: good  Mandibular Strength and Mobility: WFL  Oral Labial Strength and Mobility: WFL  Lingual Strength and Mobility: WFL    Bedside Swallow Eval:   Consistencies Assessed:  Thin liquids x3 tsp and x2 cup sips  Puree x2 tsp      Oral Phase:   WNL    Pharyngeal Phase:   Delayed inconsistent coughing with thin liquids    Compensatory Strategies  None    Treatment: Spoke with family at bedside regarding safe swallow strategies. Family reports approx 6 month hx of coughing on thin liquids, does not appear to be an acute development of dysphagia. Provided family with strategies to reduce the instances of dysphagia. Family reporting patient is able to tolerate solids without signs of aspiration at home, they also report pt usually has a good appetite though she has been with a dec appetite this admission.  Would recommend pt continue current diet though with aspiration precautions to allow for continued quality of life. Family in agreement with plan and verbalizing understanding of aspiration precautions including no straws, small sips via cup or tsp and chopping up solids as needed. No further skilled ST services indicated.     Goals:   Multidisciplinary Problems       SLP Goals       Not on file                    Plan:       SLP Follow-Up:  No       Discharge recommendations:    none   Barriers to Discharge:  None    Time Tracking:     SLP Treatment Date:   07/23/24  Speech Start Time:  1525  Speech Stop Time:  1542     Speech Total Time (min):  17 min    Billable Minutes: Eval Swallow and Oral Function 7 and Self Care/Home Management Training 10    07/23/2024

## 2024-07-23 NOTE — SUBJECTIVE & OBJECTIVE
No past medical history on file.    No past surgical history on file.    Review of patient's allergies indicates:  Not on File    Current Facility-Administered Medications   Medication    0.9%  NaCl infusion (for blood administration)    acetaminophen tablet 650 mg    dextrose 10% bolus 125 mL 125 mL    dextrose 10% bolus 250 mL 250 mL    glucagon (human recombinant) injection 1 mg    glucose chewable tablet 16 g    glucose chewable tablet 24 g    melatonin tablet 6 mg    naloxone 0.4 mg/mL injection 0.02 mg    ondansetron injection 4 mg    oxyCODONE immediate release tablet 10 mg    oxyCODONE immediate release tablet 5 mg    prochlorperazine injection Soln 5 mg    sodium chloride 0.9% flush 10 mL     No current outpatient medications on file.     Family History    None       Tobacco Use    Smoking status: Not on file    Smokeless tobacco: Not on file   Substance and Sexual Activity    Alcohol use: Not on file    Drug use: Not on file    Sexual activity: Not on file     Review of Systems   Unable to perform ROS: Dementia     Objective:     Vital Signs (Most Recent):  Temp: 98.7 °F (37.1 °C) (07/22/24 1741)  Pulse: 62 (07/22/24 1741)  Resp: 18 (07/22/24 2030)  BP: 121/83 (07/22/24 1741)  SpO2: 95 % (07/22/24 1440) Vital Signs (24h Range):  Temp:  [98.7 °F (37.1 °C)-98.8 °F (37.1 °C)] 98.7 °F (37.1 °C)  Pulse:  [62-63] 62  Resp:  [18-19] 18  SpO2:  [95 %] 95 %  BP: (121-123)/(83-86) 121/83           There is no height or weight on file to calculate BMI.    No intake or output data in the 24 hours ending 07/22/24 2200     Ortho/SPM Exam  General:  no acute distress, appears stated age   Neuro: alert and oriented x3  Psych: normal mood  Head: normocephalic, atraumatic.  Eyes: no scleral icterus  Mouth: moist mucous membranes  Cardiovascular: extremities warm and well perfused  Lungs: breathing comfortably, equal chest rise bilat  Skin: clean, dry, intact (any exceptions noted in below musculoskeletal  exam)    MSK:  RUE:  - Skin intact throughout, no open wounds  - No swelling  - No ecchymosis, erythema, or signs of cellulitis  - NonTTP throughout  - AROM and PROM of the shoulder, elbow, wrist, and hand intact without pain  - Axillary/AIN/PIN/Radial/Median/Ulnar Nerves assessed in isolation without deficit  - SILT throughout  - Compartments soft  - Radial artery palpated   - Capillary Refill <3s    LUE:  - Skin intact throughout, no open wounds  - No swelling  - No ecchymosis, erythema, or signs of cellulitis  - NonTTP throughout  - AROM and PROM of the shoulder, elbow, wrist, and hand intact without pain  - Axillary/AIN/PIN/Radial/Median/Ulnar Nerves assessed in isolation without deficit  - SILT throughout  - Compartments soft  - Radial artery palpated   - Capillary Refill <3s    RLE:  - Skin intact throughout, no open wounds  - No swelling  - Ecchymosis over medial knee  - NonTTP throughout  - AROM and PROM of the hip, ankle, and foot intact without pain  - Pain with ROM of knee  - TA/EHL/Gastroc/FHL assessed in isolation without deficit  - SILT throughout  - Compartments soft  - DP palpated  - Capillary Refill <3s    LLE:  - Skin intact throughout, no open wounds  - Moderate swelling to distal femur  - Ecchymosis over lateral aspect of distal femur and distal lower leg  - TTP at distal femur  - able to wiggle toes  - AROM/PROM of ankle and foot intact without pain  - ROM of knee and hip not assessed 2/2 known fracture  - TA/EHL/Gastroc/FHL assessed in isolation without deficit  - SILT throughout  - Compartments soft  - DP palpated  - Capillary Refill <3s      Spine/pelvis/axial body:  No pain with compression of pelvis  No chest wall or abdominal tenderness         Significant Labs: A1C:   Recent Labs   Lab 07/22/24  1923   HGBA1C 5.4     CBC:   Recent Labs   Lab 07/22/24  1555   WBC 10.70   HGB 8.4*   HCT 26.9*        CMP:   Recent Labs   Lab 07/22/24  1555      K 4.7      CO2 23   *    BUN 50*   CREATININE 1.7*   CALCIUM 8.4*   ANIONGAP 7*     Coagulation:   Recent Labs   Lab 07/22/24 1923   LABPROT 10.3   INR 0.9     Prealbumin:   Recent Labs   Lab 07/22/24 1923   PREALBUMIN 22       All pertinent labs within the past 24 hours have been reviewed.    Significant Imaging: I have reviewed and interpreted all pertinent imaging results/findings.  XR L femur: Comminuted and Displaced Distal femur fracture

## 2024-07-23 NOTE — PLAN OF CARE
07/23/24 1300   Discharge Assessment   Assessment Type Discharge Planning Assessment   Confirmed/corrected address, phone number and insurance Yes   Confirmed Demographics Correct on Facesheet   Source of Information family;health record   If unable to respond/provide information was family/caregiver contacted? Yes   Contact Name/Number albert Vargas @[phone]   Does patient/caregiver understand observation status Yes  (pt is inpatient)   Communicated KONSTANTIN with patient/caregiver No   Reason For Admission fall   People in Home facility resident   Facility Arrived From: [facility]   Do you expect to return to your current living situation? No  (family does not want patient to return to [facility])   Do you have help at home or someone to help you manage your care at home? Yes   Who are your caregiver(s) and their phone number(s)? albert Vargas @[phone]   Walking or Climbing Stairs Difficulty yes   Walking or Climbing Stairs ambulation difficulty, requires equipment;ambulation difficulty, assistance 1 person;transferring difficulty, assistance 1 person   Mobility Management wheel chair /hospital  bed   Dressing/Bathing Difficulty yes   Equipment Currently Used at Home wheelchair;walker, rolling;3-in-1 commode   Readmission within 30 days? No   Patient currently being followed by outpatient case management? No   Do you take prescription medications? Yes   Do you have prescription coverage? Yes   Coverage medicare a and b   Do you have any problems affording any of your prescribed medications? No   Is the patient taking medications as prescribed? yes   Who is going to help you get home at discharge? albert Vargas @[phone]   How do you get to doctors appointments? family or friend will provide   Are you on dialysis? No   Do you take coumadin? No   Discharge Plan A Home Health;Hospice/home   DME Needed Upon Discharge  hospital bed;oxygen   Discharge Plan discussed  with: Adult children   Transition of Care Barriers Mobility   Physical Activity   On average, how many days per week do you engage in moderate to strenuous exercise (like a brisk walk)? 0 days   On average, how many minutes do you engage in exercise at this level? 0 min   Financial Resource Strain   How hard is it for you to pay for the very basics like food, housing, medical care, and heating? Somewhat   Housing Stability   In the last 12 months, was there a time when you were not able to pay the mortgage or rent on time? N   At any time in the past 12 months, were you homeless or living in a shelter (including now)? N   Transportation Needs   Has the lack of transportation kept you from medical appointments, meetings, work or from getting things needed for daily living? No   Food Insecurity   Within the past 12 months, you worried that your food would run out before you got the money to buy more. Never true   Within the past 12 months, the food you bought just didn't last and you didn't have money to get more. Never true   Social Isolation   How often do you feel lonely or isolated from those around you?  Never   Alcohol Use   Q2: How many drinks containing alcohol do you have on a typical day when you are drinking? None   Q3: How often do you have six or more drinks on one occasion? Never   Utilities   In the past 12 months has the electric, gas, oil, or water company threatened to shut off services in your home? No   Health Literacy   How often do you need to have someone help you when you read instructions, pamphlets, or other written material from your doctor or pharmacy? Never   OTHER   Name(s) of People in Home Mariza Gold ,daughter @[phone]     JOSEPH met with patient at bedside to complete discharge planning assessment.   Patient verified all demographic information on facesheet is correct.  Patient verified PCP is Dr. Knight.Patient was at [facility] as inpatient. Patient verified primary health  insurance is medicare A B.  Patient with NO home health but h as listed DME.  Patient with daughter and Living Will.  Patient not on dialysis or medication coumadin.  Patient with no 30 day admission.  Patient with no financial issues at this time.  Patient will transportation home upon discharge from facility.  Patient's family do not want the pt to return to [facility].

## 2024-07-23 NOTE — ANESTHESIA PROCEDURE NOTES
L Femoral PNC    Patient location during procedure: pre-op   Block not for primary anesthetic.  Reason for block: at surgeon's request and post-op pain management   Post-op Pain Location: L Leg Pain   Start time: 7/23/2024 6:35 AM  Timeout: 7/23/2024 6:35 AM   End time: 7/23/2024 6:50 AM    Staffing  Authorizing Provider: Milla Andersen MD  Performing Provider: Corrine Sloan MD    Staffing  Performed by: Corrine Sloan MD  Authorized by: Milla Andersen MD    Preanesthetic Checklist  Completed: patient identified, IV checked, site marked, risks and benefits discussed, surgical consent, monitors and equipment checked, pre-op evaluation and timeout performed  Peripheral Block  Patient position: supine  Prep: ChloraPrep and site prepped and draped  Patient monitoring: heart rate, cardiac monitor, continuous pulse ox, continuous capnometry and frequent blood pressure checks  Block type: femoral  Laterality: left  Injection technique: continuous  Needle  Needle type: Tuohy   Needle gauge: 17 G  Needle length: 3.5 in  Needle localization: anatomical landmarks and ultrasound guidance  Catheter type: spring wound  Catheter size: 19 G  Test dose: lidocaine 1.5% with Epi 1-to-200,000 and negative   -ultrasound image captured on disc.  Assessment  Injection assessment: negative aspiration, negative parasthesia and local visualized surrounding nerve  Paresthesia pain: none  Heart rate change: no  Slow fractionated injection: yes  Pain Tolerance: comfortable throughout block and no complaints  Medications:    Medications: ropivacaine (NAROPIN) injection 0.5% - Perineural   10 mL - 7/23/2024 6:50:00 AM    Additional Notes  VSS.  [facility] RN monitoring vitals throughout procedure.  Patient tolerated procedure well.

## 2024-07-23 NOTE — ASSESSMENT & PLAN NOTE
-Mentation at baseline per family. Delirium precautions ordered  -Continue home seroquel and namenda

## 2024-07-23 NOTE — PROGRESS NOTES
Nursing Transfer Note      Reason patient is being transferred: Post procedure    Transfer To: 527    Transfer via bed    Transfer with Perineural pump    Transported by PCT    Medicines sent: None    Any special needs or follow-up needed: Routine    Chart send with patient: Yes    Notified: Daughter    Patient reassessed at: 1030 7/23/2024

## 2024-07-23 NOTE — H&P
[facility] Medicine  History & Physical    Patient Name: Braby Burrell  MRN: [id number]  Patient Class: IP- Inpatient  Admission Date: 7/22/2024  Attending Physician: Charlene Greene MD   Primary Care Provider: No primary care provider on file.         Patient information was obtained from patient, past medical records, and ER records.     Subjective:     Principal Problem:Closed fracture of left distal femur    Chief Complaint:   Chief Complaint   Patient presents with    Leg Injury     Arrived via ems from [address] [place] [address] with c/o deformity to left upper leg, denies pt with fall or trauma, x-ray done today with distal femur fracture diagnosed, pt oriented to self only at baseline        HPI: 89 yo F with PMHx dementia and HLD who presents to the ED from care facility with L leg pain x a few days. Pt. Alert to person and occasionally polace, but she is unable to provide history. All history obtained per daughter at bedside. Per daughter, pt. Was last seen normal Thursday evening when her sister visited her. On Friday morning she received a call stating that the patient had been complaing of L leg pain and had a swollen knee. Pt. Currently on hospice, but given the apparent acute injury, an X-ray was obtained which revealed a distal femur fracture. Case was discussed with family who decided to send pt. To ED for orthopedic surgery evaluation and potential surgery.    Per family, at baseline pt. Is mostly wheelchair bound. She previously lived in [place] with 24/hr care givers until family decided recently to move her to a healthcare facility with hospice care closer to their home just a few weeks ago.    No past medical history on file.    No past surgical history on file.    Review of patient's allergies indicates:  Not on File    No current facility-administered medications on file prior to encounter.     No current outpatient medications on file prior to encounter.      Family History    None       Tobacco Use    Smoking status: Not on file    Smokeless tobacco: Not on file   Substance and Sexual Activity    Alcohol use: Not on file    Drug use: Not on file    Sexual activity: Not on file     Review of Systems   Unable to perform ROS: Dementia     Objective:     Vital Signs (Most Recent):  Temp: 98.7 °F (37.1 °C) (07/22/24 1741)  Pulse: 62 (07/22/24 1741)  Resp: 18 (07/22/24 2030)  BP: 121/83 (07/22/24 1741)  SpO2: 95 % (07/22/24 1440) Vital Signs (24h Range):  Temp:  [98.7 °F (37.1 °C)-98.8 °F (37.1 °C)] 98.7 °F (37.1 °C)  Pulse:  [62-63] 62  Resp:  [18-19] 18  SpO2:  [95 %] 95 %  BP: (121-123)/(83-86) 121/83        There is no height or weight on file to calculate BMI.     Physical Exam  Vitals reviewed.   Constitutional:       General: She is awake. She is not in acute distress.     Appearance: She is underweight.   HENT:      Head: Normocephalic and atraumatic.   Eyes:      Extraocular Movements: Extraocular movements intact.      Pupils: Pupils are equal, round, and reactive to light.   Neck:      Vascular: No JVD.      Trachea: No tracheal deviation.   Cardiovascular:      Rate and Rhythm: Normal rate and regular rhythm.      Heart sounds: No murmur heard.     No friction rub. No gallop.   Pulmonary:      Effort: No respiratory distress.      Breath sounds: Normal breath sounds. No wheezing or rales.   Abdominal:      General: Bowel sounds are normal. There is no distension.      Palpations: Abdomen is soft. There is no mass.      Tenderness: There is no abdominal tenderness.   Musculoskeletal:         General: Swelling, tenderness and deformity present.      Cervical back: Neck supple.   Lymphadenopathy:      Cervical: No cervical adenopathy.   Skin:     General: Skin is warm and dry.      Findings: No rash.   Neurological:      Mental Status: She is disoriented.              CRANIAL NERVES     CN III, IV, VI   Pupils are equal, round, and reactive to light.        Significant Labs: All pertinent labs within the past 24 hours have been reviewed.    Significant Imaging: I have reviewed all pertinent imaging results/findings within the past 24 hours.  Assessment/Plan:     * Closed fracture of left distal femur  -Orthopedic surgery consulted and plan to take patient to the OR tomorrow. Pt. NPO at midnight for surgical repair of distal femur fracture  -Pain control with multimodal pain regimen with scheduled Tylenol and Lyrica 75 mg po nightly. Oxycodone PRN. IV morphine PRN for pain not controlled by PO medications  -DVT prophylaxis pre-op with TEDs/SCDs.   -Plan to monitor daily electrolytes and H/H post-op.   -Consult  and case management to assist with discharge planning for this patient after surgery.        Preoperative Cardiac evaluation  Cardiovascular Risk Assessment:  Non-emergent surgery.  No active cardiac problems (such as unstable angina, decompensated heart failure, significant uncontrolled arrhythmias or severe valvular disease).  Intermediate risk surgery.  Functional Status: She IS NOT able to climb a flight of stairs (> 4 METS) with no CP or SOB  Her revised cardiac risk index is 1.     1 pt Each: Ischemic Heart Disease, Cerebrovascular Disease,                     CHF, DM, Creatinine > 2           Other Issues: Advanced age and dementia put pt. At higher risk, but she has no signs of acute decompensation and is stable for surgery without further cardiac testing needed        HTN (hypertension)  -Continue home amlodipine    Normocytic anemia  -Hgb 8.4 on presentation, baseline unclear. Anemia work-up ordered. 1 unit ordered pre-op per ortho, monitor CBC daily while admitted for surgery    Kidney disease  -Cr 1.7 on presentation, baseline unclear. IV fluids ordered while NPO, monitor kidney function daily while admitted for surgery      Dementia without behavioral disturbance, psychotic disturbance, mood disturbance, or anxiety  -Mentation at  baseline per family. Delirium precautions ordered  -Continue home seroquel and namenda      VTE Risk Mitigation (From admission, onward)           Ordered     IP VTE HIGH RISK PATIENT  Once         07/22/24 1950     Place sequential compression device  Until discontinued         07/22/24 1950                                    Erwin Lazar MD  Department of Hospital Medicine  [facility] - Surgery

## 2024-07-23 NOTE — ASSESSMENT & PLAN NOTE
-Per family, agree for treatment and intubation during surgery. She will remain DNR however post-operatively

## 2024-07-23 NOTE — ASSESSMENT & PLAN NOTE
Barby Burrell is a 89 y.o. female with a PMHx of R hip fx 5 years ago s/p IMN and L hip fx 4 years ago s/p short IMN, HTN and dementia presenting with left distal femur fracture, closed, NVI. They take no anticoagulation at home. They were nonambulatory prior to this injury.    -Admitted to medicine for pre-operative clearance and medical evaluation  -To OR 7/22/24 for operative fixation of left distal femur fracture  -Pt marked, booked, and consented for surgery  -DVT PPx: Hold anticoagulation  -Abx: Preop abx ordered  -Labs: As above  -Bed rest, ireland, NPO at midnight  -Iv: ordered for contralateral arm    Procedure Note: Left Tibial Skeletal Traction Pin  After time out was performed and patient ID, side, and site were verified, the left proximal tibia was sterilly prepped in the standard fashion.  10 mL's of 1% xylocaine were injected into the medial and 10 mL's into the lateral aspect of the proximal tibia. After adequate analgesia was obtained, a 2.4 K wire was placed across the proximal tibia from lateral to medial. Post procedure XR confirmed appropriate placement of the K wire. Procedure was tolerated well. Blood loss was minimal.

## 2024-07-23 NOTE — SUBJECTIVE & OBJECTIVE
No past medical history on file.    No past surgical history on file.    Review of patient's allergies indicates:  Not on File    No current facility-administered medications on file prior to encounter.     No current outpatient medications on file prior to encounter.     Family History    None       Tobacco Use    Smoking status: Not on file    Smokeless tobacco: Not on file   Substance and Sexual Activity    Alcohol use: Not on file    Drug use: Not on file    Sexual activity: Not on file     Review of Systems   Unable to perform ROS: Dementia     Objective:     Vital Signs (Most Recent):  Temp: 98.7 °F (37.1 °C) (07/22/24 1741)  Pulse: 62 (07/22/24 1741)  Resp: 18 (07/22/24 2030)  BP: 121/83 (07/22/24 1741)  SpO2: 95 % (07/22/24 1440) Vital Signs (24h Range):  Temp:  [98.7 °F (37.1 °C)-98.8 °F (37.1 °C)] 98.7 °F (37.1 °C)  Pulse:  [62-63] 62  Resp:  [18-19] 18  SpO2:  [95 %] 95 %  BP: (121-123)/(83-86) 121/83        There is no height or weight on file to calculate BMI.     Physical Exam  Vitals reviewed.   Constitutional:       General: She is awake. She is not in acute distress.     Appearance: She is underweight.   HENT:      Head: Normocephalic and atraumatic.   Eyes:      Extraocular Movements: Extraocular movements intact.      Pupils: Pupils are equal, round, and reactive to light.   Neck:      Vascular: No JVD.      Trachea: No tracheal deviation.   Cardiovascular:      Rate and Rhythm: Normal rate and regular rhythm.      Heart sounds: No murmur heard.     No friction rub. No gallop.   Pulmonary:      Effort: No respiratory distress.      Breath sounds: Normal breath sounds. No wheezing or rales.   Abdominal:      General: Bowel sounds are normal. There is no distension.      Palpations: Abdomen is soft. There is no mass.      Tenderness: There is no abdominal tenderness.   Musculoskeletal:         General: Swelling, tenderness and deformity present.      Cervical back: Neck supple.   Lymphadenopathy:       Cervical: No cervical adenopathy.   Skin:     General: Skin is warm and dry.      Findings: No rash.   Neurological:      Mental Status: She is disoriented.              CRANIAL NERVES     CN III, IV, VI   Pupils are equal, round, and reactive to light.       Significant Labs: All pertinent labs within the past 24 hours have been reviewed.    Significant Imaging: I have reviewed all pertinent imaging results/findings within the past 24 hours.

## 2024-07-24 PROBLEM — H40.1131 PRIMARY OPEN ANGLE GLAUCOMA (POAG) OF BOTH EYES, MILD STAGE: Status: ACTIVE | Noted: 2024-07-23

## 2024-07-24 PROBLEM — S72.402D CLOSED FRACTURE OF DISTAL END OF LEFT FEMUR WITH ROUTINE HEALING: Status: ACTIVE | Noted: 2024-07-22

## 2024-07-24 PROBLEM — S72.462D: Status: ACTIVE | Noted: 2024-07-22

## 2024-07-24 LAB
ALBUMIN SERPL BCP-MCNC: 2.7 G/DL (ref 3.5–5.2)
ALP SERPL-CCNC: 79 U/L (ref 55–135)
ALT SERPL W/O P-5'-P-CCNC: 7 U/L (ref 10–44)
ANION GAP SERPL CALC-SCNC: 6 MMOL/L (ref 8–16)
AST SERPL-CCNC: 12 U/L (ref 10–40)
BASOPHILS # BLD AUTO: 0.02 K/UL (ref 0–0.2)
BASOPHILS NFR BLD: 0.2 % (ref 0–1.9)
BILIRUB SERPL-MCNC: 0.3 MG/DL (ref 0.1–1)
BUN SERPL-MCNC: 38 MG/DL (ref 10–30)
CALCIUM SERPL-MCNC: 8.1 MG/DL (ref 8.7–10.5)
CHLORIDE SERPL-SCNC: 110 MMOL/L (ref 95–110)
CO2 SERPL-SCNC: 22 MMOL/L (ref 23–29)
CREAT SERPL-MCNC: 1.3 MG/DL (ref 0.5–1.4)
DIFFERENTIAL METHOD BLD: ABNORMAL
EOSINOPHIL # BLD AUTO: 0.1 K/UL (ref 0–0.5)
EOSINOPHIL NFR BLD: 1.1 % (ref 0–8)
ERYTHROCYTE [DISTWIDTH] IN BLOOD BY AUTOMATED COUNT: 15.9 % (ref 11.5–14.5)
EST. GFR  (NO RACE VARIABLE): 37.4 ML/MIN/1.73 M^2
GLUCOSE SERPL-MCNC: 93 MG/DL (ref 70–110)
HCT VFR BLD AUTO: 27.1 % (ref 37–48.5)
HGB BLD-MCNC: 8.6 G/DL (ref 12–16)
IMM GRANULOCYTES # BLD AUTO: 0.3 K/UL (ref 0–0.04)
IMM GRANULOCYTES NFR BLD AUTO: 3.2 % (ref 0–0.5)
LYMPHOCYTES # BLD AUTO: 1.1 K/UL (ref 1–4.8)
LYMPHOCYTES NFR BLD: 11.4 % (ref 18–48)
MCH RBC QN AUTO: 30.2 PG (ref 27–31)
MCHC RBC AUTO-ENTMCNC: 31.7 G/DL (ref 32–36)
MCV RBC AUTO: 95 FL (ref 82–98)
MONOCYTES # BLD AUTO: 0.9 K/UL (ref 0.3–1)
MONOCYTES NFR BLD: 9.7 % (ref 4–15)
NEUTROPHILS # BLD AUTO: 6.9 K/UL (ref 1.8–7.7)
NEUTROPHILS NFR BLD: 74.4 % (ref 38–73)
NRBC BLD-RTO: 0 /100 WBC
PLATELET # BLD AUTO: 269 K/UL (ref 150–450)
PMV BLD AUTO: 11.2 FL (ref 9.2–12.9)
POTASSIUM SERPL-SCNC: 4.2 MMOL/L (ref 3.5–5.1)
PROT SERPL-MCNC: 4.9 G/DL (ref 6–8.4)
RBC # BLD AUTO: 2.85 M/UL (ref 4–5.4)
SODIUM SERPL-SCNC: 138 MMOL/L (ref 136–145)
WBC # BLD AUTO: 9.3 K/UL (ref 3.9–12.7)

## 2024-07-24 PROCEDURE — 94761 N-INVAS EAR/PLS OXIMETRY MLT: CPT

## 2024-07-24 PROCEDURE — 25000003 PHARM REV CODE 250: Performed by: HOSPITALIST

## 2024-07-24 PROCEDURE — 25000003 PHARM REV CODE 250

## 2024-07-24 PROCEDURE — 99231 SBSQ HOSP IP/OBS SF/LOW 25: CPT | Mod: GC,,, | Performed by: ANESTHESIOLOGY

## 2024-07-24 PROCEDURE — 97535 SELF CARE MNGMENT TRAINING: CPT

## 2024-07-24 PROCEDURE — 97112 NEUROMUSCULAR REEDUCATION: CPT

## 2024-07-24 PROCEDURE — 11000001 HC ACUTE MED/SURG PRIVATE ROOM

## 2024-07-24 PROCEDURE — 97530 THERAPEUTIC ACTIVITIES: CPT

## 2024-07-24 PROCEDURE — 85025 COMPLETE CBC W/AUTO DIFF WBC: CPT | Performed by: HOSPITALIST

## 2024-07-24 PROCEDURE — 36415 COLL VENOUS BLD VENIPUNCTURE: CPT | Performed by: HOSPITALIST

## 2024-07-24 PROCEDURE — 97165 OT EVAL LOW COMPLEX 30 MIN: CPT

## 2024-07-24 PROCEDURE — 63600175 PHARM REV CODE 636 W HCPCS

## 2024-07-24 PROCEDURE — 80053 COMPREHEN METABOLIC PANEL: CPT | Performed by: HOSPITALIST

## 2024-07-24 PROCEDURE — 97161 PT EVAL LOW COMPLEX 20 MIN: CPT

## 2024-07-24 RX ORDER — TALC
6 POWDER (GRAM) TOPICAL NIGHTLY PRN
Start: 2024-07-24

## 2024-07-24 RX ORDER — LATANOPROST 50 UG/ML
1 SOLUTION/ DROPS OPHTHALMIC NIGHTLY
Start: 2024-07-24 | End: 2025-07-24

## 2024-07-24 RX ORDER — ACETAMINOPHEN 500 MG
1000 TABLET ORAL EVERY 8 HOURS
Status: DISCONTINUED | OUTPATIENT
Start: 2024-07-24 | End: 2024-07-26 | Stop reason: HOSPADM

## 2024-07-24 RX ORDER — TIMOLOL MALEATE 2.5 MG/ML
1 SOLUTION/ DROPS OPHTHALMIC NIGHTLY
Start: 2024-07-24 | End: 2025-07-24

## 2024-07-24 RX ORDER — OXYCODONE HYDROCHLORIDE 5 MG/1
5 TABLET ORAL EVERY 6 HOURS PRN
Start: 2024-07-24 | End: 2024-07-26

## 2024-07-24 RX ORDER — ACETAMINOPHEN 500 MG
1000 TABLET ORAL EVERY 8 HOURS
Start: 2024-07-24 | End: 2024-07-26

## 2024-07-24 RX ORDER — AMOXICILLIN 250 MG
1 CAPSULE ORAL DAILY
Start: 2024-07-24 | End: 2024-07-26

## 2024-07-24 RX ADMIN — AMLODIPINE BESYLATE 5 MG: 5 TABLET ORAL at 09:07

## 2024-07-24 RX ADMIN — ACETAMINOPHEN 1000 MG: 500 TABLET ORAL at 02:07

## 2024-07-24 RX ADMIN — ASPIRIN 81 MG CHEWABLE TABLET 81 MG: 81 TABLET CHEWABLE at 09:07

## 2024-07-24 RX ADMIN — MEMANTINE 5 MG: 5 TABLET ORAL at 09:07

## 2024-07-24 RX ADMIN — APIXABAN 2.5 MG: 2.5 TABLET, FILM COATED ORAL at 09:07

## 2024-07-24 RX ADMIN — ACETAMINOPHEN 1000 MG: 500 TABLET ORAL at 09:07

## 2024-07-24 RX ADMIN — LATANOPROST 1 DROP: 50 SOLUTION OPHTHALMIC at 09:07

## 2024-07-24 RX ADMIN — OXYCODONE 5 MG: 5 TABLET ORAL at 12:07

## 2024-07-24 RX ADMIN — CEFAZOLIN 2 G: 2 INJECTION, POWDER, FOR SOLUTION INTRAMUSCULAR; INTRAVENOUS at 12:07

## 2024-07-24 RX ADMIN — ACETAMINOPHEN 1000 MG: 500 TABLET ORAL at 10:07

## 2024-07-24 RX ADMIN — CEFAZOLIN 2 G: 2 INJECTION, POWDER, FOR SOLUTION INTRAMUSCULAR; INTRAVENOUS at 09:07

## 2024-07-24 RX ADMIN — QUETIAPINE FUMARATE 100 MG: 100 TABLET ORAL at 09:07

## 2024-07-24 RX ADMIN — TIMOLOL MALEATE 1 DROP: 2.5 SOLUTION/ DROPS OPHTHALMIC at 09:07

## 2024-07-24 RX ADMIN — SENNOSIDES AND DOCUSATE SODIUM 1 TABLET: 50; 8.6 TABLET ORAL at 09:07

## 2024-07-24 NOTE — NURSING
Patient is  Ao*2 . Complains of  pain , PRN meds  given . Patient repositioned frequently . Safety precaution maintained . Will continue monitoring .

## 2024-07-24 NOTE — PROGRESS NOTES
[facility] - Surgery  Orthopedics  Progress Note    Patient Name: Barby Burrell  MRN: [id number]  Admission Date: 7/22/2024  Hospital Length of Stay: 2 days  Attending Provider: Charlene Greene MD  Primary Care Provider: Lori, Primary Doctor  Follow-up For: Procedure(s) (LRB):  ORIF, FRACTURE, DISTAL FEMUR (Left)    Post-Operative Day: 1 Day Post-Op  Subjective:     Principal Problem:Closed fracture of left distal femur    Principal Orthopedic Problem: as above, s/p retrograde IMN 07/23    Interval History: Pt seen and examined at bedside. FLORECITA GUNDERSON. Reports no new symptoms. Denies fevers or chill.       Review of patient's allergies indicates:  No Known Allergies    Current Facility-Administered Medications   Medication    0.9%  NaCl infusion (for blood administration)    amLODIPine tablet 5 mg    apixaban tablet 2.5 mg    aspirin chewable tablet 81 mg    ceFAZolin 2 g in D5W 50 mL IVPB (MB+)    dextrose 10% bolus 125 mL 125 mL    dextrose 10% bolus 250 mL 250 mL    glucagon (human recombinant) injection 1 mg    glucose chewable tablet 16 g    glucose chewable tablet 24 g    HYDROmorphone injection 0.2 mg    latanoprost 0.005 % ophthalmic solution 1 drop    melatonin tablet 6 mg    memantine tablet 5 mg    naloxone 0.4 mg/mL injection 0.02 mg    ondansetron injection 4 mg    oxyCODONE immediate release tablet 5 mg    prochlorperazine injection Soln 5 mg    QUEtiapine tablet 100 mg    ropivacaine 0.2% Perineural Pump infusion 500 ML    senna-docusate 8.6-50 mg per tablet 1 tablet    sodium chloride 0.9% flush 10 mL    timolol maleate 0.25% ophthalmic solution 1 drop     Objective:     Vital Signs (Most Recent):  Temp: 97.6 °F (36.4 °C) (07/24/24 0455)  Pulse: 95 (07/24/24 0455)  Resp: 18 (07/24/24 0455)  BP: 126/60 (07/24/24 0455)  SpO2: 96 % (07/24/24 0455) Vital Signs (24h Range):  Temp:  [97.3 °F (36.3 °C)-98.4 °F (36.9 °C)] 97.6 °F (36.4 °C)  Pulse:  [77-98] 95  Resp:  [12-69] 18  SpO2:  [93 %-100 %] 96  "%  BP: (126-182)/(58-85) 126/60     Weight: 72.6 kg (160 lb)  Height: 5' 4" (162.6 cm)  Body mass index is 27.46 kg/m².      Intake/Output Summary (Last 24 hours) at 7/24/2024 0630  Last data filed at 7/23/2024 1817  Gross per 24 hour   Intake 1000 ml   Output 755 ml   Net 245 ml        Ortho/SPM Exam     AAOx1  NAD  Reg rate  No increased WOB    LLE    Dressing C/D/I   Compartments soft/compressible  Ankle dorsiflexion & plantarflexion intact  Pt no responsive to sensation exam  Brisk cap refill            Significant Labs: All pertinent labs within the past 24 hours have been reviewed.    Significant Imaging: I have reviewed and interpreted all pertinent imaging results/findings.  Assessment/Plan:     * Closed fracture of left distal femur  Barby Burrell is a 89 y.o. female with a PMHx of R hip fx 5 years ago s/p IMN and L hip fx 4 years ago s/p short IMN, HTN and dementia presenting with left distal femur fracture, closed, NVI. They take no anticoagulation at home. They were nonambulatory prior to this injury. She is s/p L femur retrograde IMN on 07/23    -DVT PPx: Eliquis  -Abx: post-op ancef  PT/OT: evaluate & treat. WBAT CLAUDIO Romero MD  Orthopedics  [facility] - Surgery    "

## 2024-07-24 NOTE — PLAN OF CARE
07/24/24 1048   Post-Acute Status   Post-Acute Authorization Hospice   Post-Acute Placement Status Pending medical clearance/testing   Discharge Plan   Discharge Plan A Home with family;Hospice/home     JOSEPH met with pt's sister Emily at bedside to discuss discharge plan, JOSEPH informed to contact Mariza, who is pt's daughter and responsible party. JOSEPH called Mariza left voice message to return call.    Pt's daughter Mariza returned call informed plan is for the patient to discharge to her home with Hospice care ([facility]). JOSEPH educated on the level of care of Rehab. SW to follow.    JOSEPH hard faxed clinicals to ([facility]) Phone # [phone], Fax # [phone]. Also requesting DME: Wheelchair, Hospital bed, and martha lift. JOSEPH to follow.      Paola Cruz, JOSEPH  Case Management   [facility]   Ext. [phone]

## 2024-07-24 NOTE — PLAN OF CARE
PT dick completed- see note for details, goals and POC established.     Problem: Physical Therapy  Goal: Physical Therapy Goal  Description: Goals to be met by: 24     Patient will increase functional independence with mobility by performin. Supine to sit with Moderate Assistance  2. Sit to supine with Moderate Assistance  3. Rolling to Left and Right with Minimal Assistance.  4. Sit to stand transfer with Moderate Assistance  5. Bed to chair transfer with Moderate Assistance using LRAD  6. Sitting at edge of bed x8 minutes with Minimal Assistance    DME Justification  Barby requires a commode for home use because she is confined to a single room.  Barby requires a hospital bed due to her requiring positioning of the body in ways not feasible with an ordinary bed due to limited ability and cannot independently make changes in body position without the use of the bed.    Patient requires a hospital bed for positioning of the body in ways that are not feasible with an ordinary bed. The patient requires special positioning for pain relief, limited mobility, and/or being unable to independently make changes in body position without the use of a hospital bed. Pillows and wedges will not be adequate for resolving these positional issues.        Barby Burrell has a mobility limitation that significantly impairs her ability to participate in one or more mobility related activities in the community. The mobility limitation cannot be sufficiently resolved by the use of a cane or walker. The use of a transport wheelchair will significantly improve the patient's ability to participate in the community and the patient will use it on regular basis outside the home. Barby has expressed her willingness to use a transport wheelchair outside the home. she also has a caregiver who is available, willing, and able to provide assistance with the wheelchair when needed.     Outcome: Progressing   2024

## 2024-07-24 NOTE — PT/OT/SLP EVAL
Physical Therapy Co-Evaluation and Treatment    OT present for coeval due to pt's multiple medical comorbidities and functional/cognition deficits requiring two skilled therapists to appropriately progress pt's musculoskeletal strength, neuromuscular control, and endurance while taking into consideration medical acuity and pt safety.    Patient Name:  Barby Burrell   MRN:  [id number]    Recommendations:     Discharge Recommendations: Low Intensity Therapy   Discharge Equipment Recommendations: wheelchair, lift device, hospital bed, bedside commode   Barriers to discharge: None    Assessment:     Barby Burrell is a 89 y.o. female admitted with a medical diagnosis of Closed fracture of left distal femur.  She presents with the following impairments/functional limitations: weakness, impaired endurance, impaired self care skills, impaired functional mobility, impaired cognition, decreased lower extremity function, orthopedic precautions, gait instability, impaired balance     Pt receptive and tolerated PT co-eval with OT fairly. Pt AAO x2 this session, daughter present in room and able to answer questions on home environment. Pt's daughter reports pt was previously bed bound to w/c needing assistance to transfer to and from her w/c. Pt needed max A of 2 persons to complete bed mobility this session. Sit <> stand attempted from EOB, however pt did not demonstrated initiation of movement needing total A of 2 persons and unable to stand fully upright. Patient currently demonstrates a need for low intensity therapy on a scheduled basis secondary to a decline in functional status due to surgical procedure    Rehab Prognosis: Fair; patient would benefit from acute skilled PT services to address these deficits and reach maximum level of function.    Recent Surgery: Procedure(s) (LRB):  ORIF, FRACTURE, DISTAL FEMUR (Left) 1 Day Post-Op    Plan:     During this hospitalization, patient to be seen 3 x/week to address the  "identified rehab impairments via gait training, therapeutic activities, therapeutic exercises, neuromuscular re-education, wheelchair management/training and progress toward the following goals:    Plan of Care Expires:  08/23/24    Subjective     Chief Complaint: soreness of the LLE  Patient/Family Comments/goals:   Pain/Comfort:  Pain Rating 1:  ("soreness")  Location - Side 1: Left  Location - Orientation 1: generalized  Location 1: leg  Pain Addressed 1: Reposition, Distraction, Cessation of Activity    Patients cultural, spiritual, Evangelical conflicts given the current situation: no    Patient History:     Living Environment: Pt lives currently at [facility] as inpatient put family plans for pt to discharge to [facility].  Prior Level of Function: Primarily w/c bound needing assistance for transfers to and from w/c and to toilet  DME owned: none  Caregiver Assistance: family      Objective:     Communicated with RN prior to session.  Patient found HOB elevated with perineural catheter, FCD, ireland catheter  upon PT entry to room.    General Precautions: Standard, aspiration, fall  Orthopedic Precautions:LLE weight bearing as tolerated   Braces: N/A  Respiratory Status: Room air    Exams:  Gross Motor Coordination:  WFL  Sensation:    -       Intact  RLE ROM: WFL  RLE Strength: grossly 3/5  LLE ROM: WFL  LLE Strength: grossly 2/5    Functional Mobility:    Bed Mobility:   Supine > Sit: maximal assistance and of 2 persons  Sit > Supine: maximal assistance and of 2 persons    Transfers:   Sit <> Stand Transfer: total assistance and of 2 persons from EOB using Jani HHA  PT and OT blocking Jani Knees  Pt did not demonstrate initiation of stand and only able to achieve ~25 % hip clearance    Balance:   Sitting balance: POOR: N/A  Pt sat EOB with max A to min A due to posterior lean  Balance improved with RUE support on bed rail      AM-PAC 6 CLICK MOBILITY  Total Score:10       Treatment & Education:  Pt " educated on tip to reduce fall risk and safety with mobility and using call button for assistance from nursing staff with bed mobility.  All questions answered within the scope of PT.  White board updated accordingly.      Patient left HOB elevated with all lines intact, call button in reach, and daughter present.    GOALS:   Multidisciplinary Problems       Physical Therapy Goals          Problem: Physical Therapy    Goal Priority Disciplines Outcome Goal Variances Interventions   Physical Therapy Goal     PT, PT/OT Progressing     Description: Goals to be met by: 24     Patient will increase functional independence with mobility by performin. Supine to sit with Moderate Assistance  2. Sit to supine with Moderate Assistance  3. Rolling to Left and Right with Minimal Assistance.  4. Sit to stand transfer with Moderate Assistance  5. Bed to chair transfer with Moderate Assistance using LRAD  6. Sitting at edge of bed x8 minutes with Minimal Assistance    DME Justification  Barby requires a commode for home use because she is confined to a single room.  Barby requires a hospital bed due to her requiring positioning of the body in ways not feasible with an ordinary bed due to limited ability and cannot independently make changes in body position without the use of the bed.    Patient requires a hospital bed for positioning of the body in ways that are not feasible with an ordinary bed. The patient requires special positioning for pain relief, limited mobility, and/or being unable to independently make changes in body position without the use of a hospital bed. Pillows and wedges will not be adequate for resolving these positional issues.        Barby Burrell has a mobility limitation that significantly impairs her ability to participate in one or more mobility related activities in the community. The mobility limitation cannot be sufficiently resolved by the use of a cane or walker. The use of a  transport wheelchair will significantly improve the patient's ability to participate in the community and the patient will use it on regular basis outside the home. Barby has expressed her willingness to use a transport wheelchair outside the home. she also has a caregiver who is available, willing, and able to provide assistance with the wheelchair when needed.                          History:     No past medical history on file.    Past Surgical History:   Procedure Laterality Date    ORIF FEMUR FRACTURE Left 7/23/2024    Procedure: ORIF, FRACTURE, DISTAL FEMUR;  Surgeon: Kam Navarro MD;  Location: [facility] OR [address] [place] [address];  Service: Orthopedics;  Laterality: Left;       Time Tracking:     PT Received On: 07/24/24  PT Start Time: 0938     PT Stop Time: 1006  PT Total Time (min): 28 min     Billable Minutes: Evaluation 15 and Neuromuscular Re-education 13      07/24/2024

## 2024-07-24 NOTE — NURSING
Nurses Note -- 4 Eyes      7/24/2024   5:30 PM      Skin assessed during: Daily Assessment      [x] No Altered Skin Integrity Present    []Prevention Measures Documented      [] Yes- Altered Skin Integrity Present or Discovered   [] LDA Added if Not in Epic (Describe Wound)   [] New Altered Skin Integrity was Present on Admit and Documented in LDA   [] Wound Image Taken    Wound Care Consulted? No    Attending Nurse:  Sabrina Anand RN/Staff Member:  RICH Hamm

## 2024-07-24 NOTE — ASSESSMENT & PLAN NOTE
-Hgb 8.4 on presentation, baseline unclear. Anemia work-up ordered. 1 unit ordered pre-op per ortho and improved to 10 after this, iron studies/b12/folate wnl.

## 2024-07-24 NOTE — ASSESSMENT & PLAN NOTE
-Continue home amlodipine  Was recently taken off lisinopril by hospice so if spikes up can consdier resuming but doing okay so far today

## 2024-07-24 NOTE — PLAN OF CARE
Problem: Infection  Goal: Absence of Infection Signs and Symptoms  Outcome: Progressing     Problem: Adult Inpatient Plan of Care  Goal: Plan of Care Review  Outcome: Progressing  Goal: Patient-Specific Goal (Individualized)  Outcome: Progressing  Goal: Absence of Hospital-Acquired Illness or Injury  Outcome: Progressing  Goal: Optimal Comfort and Wellbeing  Outcome: Progressing  Goal: Readiness for Transition of Care  Outcome: Progressing     Problem: Fall Injury Risk  Goal: Absence of Fall and Fall-Related Injury  Outcome: Progressing     Problem: Skin Injury Risk Increased  Goal: Skin Health and Integrity  Outcome: Progressing     Problem: Acute Kidney Injury/Impairment  Goal: Fluid and Electrolyte Balance  Outcome: Progressing  Goal: Improved Oral Intake  Outcome: Progressing  Goal: Effective Renal Function  Outcome: Progressing     Problem: Wound  Goal: Optimal Coping  Outcome: Progressing  Goal: Optimal Functional Ability  Outcome: Progressing  Goal: Absence of Infection Signs and Symptoms  Outcome: Progressing  Goal: Improved Oral Intake  Outcome: Progressing  Goal: Optimal Pain Control and Function  Outcome: Progressing  Goal: Skin Health and Integrity  Outcome: Progressing  Goal: Optimal Wound Healing  Outcome: Progressing     Family to remain at bedside. Pt alert, calm, comfortable. SCDs in place. No c/o of pain. Gary in place draining clear yellow urine. PNC in place.

## 2024-07-24 NOTE — ASSESSMENT & PLAN NOTE
Patient with Paroxysmal (<7 days) atrial fibrillation which is controlled currently with  none . Patient is currently in sinus rhythm.[id number] Score: 3. Anticoagulation not indicated due to had no return of afib since prev hip fx surgery a few years ago when she had it happen post op and resolved and no issues sinec so stopped eliquis a few years ago and has been on only asa ppx since then. Fam okay with doing eliquis for dvt ppx for 1 month now and then stop. Telemetry, no afib so far during this stay.  .

## 2024-07-24 NOTE — PLAN OF CARE
Problem: Occupational Therapy  Goal: Occupational Therapy Goal  Description: Goals to be met by: 8/23/24     Patient will increase functional independence with ADLs by performing:    Grooming while seated with Set-up Assistance and Stand-by Assistance.  Toileting from bedside commode with Moderate Assistance for hygiene and clothing management.   All functional transfers performed with Mod A    Outcome: Progressing

## 2024-07-24 NOTE — ASSESSMENT & PLAN NOTE
-found on xrays at home from hospice company to assess for deformity, wheelchair/bedbound at baseline but can still perform transfers to go to restroom  -ortho consulted, and went on 7/24 to OR with dr brooks for Open reduction internal fixation left femoral shaft fracture with retrograde intramedullary nail   Open reduction internal fixation left distal femur supracondylar fracture, with intra-articular extension utilizing independent screw  Removal of superficial wire left proximal tibia  -wbat/rom post op and goal for transfers/prior level of function and pain control as family with realistic goals with plan for home hospice long term again, may need more DME via [employer] pending PT sessions (martha lift possibly? Will f.u recs)  -pain control with multimodals with anesthesia managing  -ireland out today  -eliquis for dvt ppx for 35 days, end date aug 28

## 2024-07-24 NOTE — ASSESSMENT & PLAN NOTE
Barby Burrell is a 89 y.o. female with a PMHx of R hip fx 5 years ago s/p IMN and L hip fx 4 years ago s/p short IMN, HTN and dementia presenting with left distal femur fracture, closed, NVI. They take no anticoagulation at home. They were nonambulatory prior to this injury. She is s/p L femur retrograde IMN on 07/23    -DVT PPx: Eliquis  -Abx: post-op ancef  PT/OT: evaluate & treat. WBAT LLE

## 2024-07-24 NOTE — ANESTHESIA POST-OP PAIN MANAGEMENT
Acute Pain Service Progress Note    Barby Burrell is a 89 y.o., female, [id number].    Surgery:  Left Distal Femur     Post Op Day #: 1    Catheter type: perineural  femoral    Infusion type: Ropivacaine 0.2%  7mL q3h IB with 5mL q30m DB     Problem List:    Active Hospital Problems    Diagnosis  POA    *Closed fracture of left distal femur [S72.402A]  Yes    Hospice care patient [Z51.5]  Not Applicable    Closed supracondylar fracture of left femur [S72.452A]  Yes    Glaucoma [H40.9]  Yes    Paroxysmal atrial fibrillation [I48.0]  Yes    Dementia without behavioral disturbance, psychotic disturbance, mood disturbance, or anxiety [F03.90]  Yes    Acute kidney injury [N17.9]  Yes    Normocytic anemia [D64.9]  Yes    HTN (hypertension) [I10]  Yes      Resolved Hospital Problems   No resolved problems to display.       Subjective:     General appearance of alert, oriented, no complaints   Pain with rest: 2    Numbers   Pain with movement: 5    Numbers   Side Effects    1. Pruritis No    2. Nausea No    3. Motor Blockade No, 0=Ability to raise lower extremities off bed    4. Sedation No, 1=awake and alert    Objective:     Catheter site  Bandage lifting up. Reinforced with tegaderm.      Vitals   Vitals:    07/24/24 1210   BP:    Pulse:    Resp: 18   Temp:         Labs    Admission on 07/22/2024   Component Date Value Ref Range Status    HIV 1/2 Ag/Ab 07/22/2024 Non-reactive  Non-reactive Final    WBC 07/22/2024 10.70  3.90 - 12.70 K/uL Final    RBC 07/22/2024 2.80 (L)  4.00 - 5.40 M/uL Final    Hemoglobin 07/22/2024 8.4 (L)  12.0 - 16.0 g/dL Final    Hematocrit 07/22/2024 26.9 (L)  37.0 - 48.5 % Final    MCV 07/22/2024 96  82 - 98 fL Final    MCH 07/22/2024 30.0  27.0 - 31.0 pg Final    MCHC 07/22/2024 31.2 (L)  32.0 - 36.0 g/dL Final    RDW 07/22/2024 14.9 (H)  11.5 - 14.5 % Final    Platelets 07/22/2024 334  150 - 450 K/uL Final    MPV 07/22/2024 11.4  9.2 - 12.9 fL Final    Immature Granulocytes 07/22/2024 3.5 (H)   0.0 - 0.5 % Final    Gran # (ANC) 07/22/2024 8.8 (H)  1.8 - 7.7 K/uL Final    Immature Grans (Abs) 07/22/2024 0.37 (H)  0.00 - 0.04 K/uL Final    Lymph # 07/22/2024 0.7 (L)  1.0 - 4.8 K/uL Final    Mono # 07/22/2024 0.8  0.3 - 1.0 K/uL Final    Eos # 07/22/2024 0.0  0.0 - 0.5 K/uL Final    Baso # 07/22/2024 0.03  0.00 - 0.20 K/uL Final    nRBC 07/22/2024 0  0 /100 WBC Final    Gran % 07/22/2024 82.5 (H)  38.0 - 73.0 % Final    Lymph % 07/22/2024 6.4 (L)  18.0 - 48.0 % Final    Mono % 07/22/2024 7.3  4.0 - 15.0 % Final    Eosinophil % 07/22/2024 0.0  0.0 - 8.0 % Final    Basophil % 07/22/2024 0.3  0.0 - 1.9 % Final    Differential Method 07/22/2024 Automated   Final    Sodium 07/22/2024 138  136 - 145 mmol/L Final    Potassium 07/22/2024 4.7  3.5 - 5.1 mmol/L Final    Chloride 07/22/2024 108  95 - 110 mmol/L Final    CO2 07/22/2024 23  23 - 29 mmol/L Final    Glucose 07/22/2024 143 (H)  70 - 110 mg/dL Final    BUN 07/22/2024 50 (H)  10 - 30 mg/dL Final    Creatinine 07/22/2024 1.7 (H)  0.5 - 1.4 mg/dL Final    Calcium 07/22/2024 8.4 (L)  8.7 - 10.5 mg/dL Final    Anion Gap 07/22/2024 7 (L)  8 - 16 mmol/L Final    eGFR 07/22/2024 27.1 (A)  >60 mL/min/1.73 m^2 Final    Group & Rh 07/22/2024 A NEG   Final    Indirect Jennifer 07/22/2024 NEG   Final    Specimen Outdate 07/22/2024 07/25/2024 23:59   Final    Troponin I 07/22/2024 0.014  0.000 - 0.026 ng/mL Final    BNP 07/22/2024 187 (H)  0 - 99 pg/mL Final    QRS Duration 07/22/2024 142  ms Final    [facility] QTC Calculation 07/22/2024 460  ms Final    Hemoglobin A1C 07/22/2024 5.4  4.0 - 5.6 % Final    Estimated Avg Glucose 07/22/2024 108  68 - 131 mg/dL Final    Magnesium 07/22/2024 2.5  1.6 - 2.6 mg/dL Final    Phosphorus 07/22/2024 3.2  2.7 - 4.5 mg/dL Final    Prealbumin 07/22/2024 22  20 - 43 mg/dL Final    Prothrombin Time 07/22/2024 10.3  9.0 - 12.5 sec Final    INR 07/22/2024 0.9  0.8 - 1.2 Final    Transferrin 07/22/2024 203  200 - 375 mg/dL Final    Vit D, 25-Hydroxy  07/22/2024 33  30 - 96 ng/mL Final    Specimen UA 07/22/2024 Urine, Clean Catch   Final    Color, UA 07/22/2024 Yellow  Yellow, Straw, Jackie Final    Appearance, UA 07/22/2024 Clear  Clear Final    pH, UA 07/22/2024 5.0  5.0 - 8.0 Final    Specific Gravity, UA 07/22/2024 1.020  1.005 - 1.030 Final    Protein, UA 07/22/2024 Trace (A)  Negative Final    Glucose, UA 07/22/2024 Trace (A)  Negative Final    Ketones, UA 07/22/2024 Negative  Negative Final    Bilirubin (UA) 07/22/2024 Negative  Negative Final    Occult Blood UA 07/22/2024 Trace (A)  Negative Final    Nitrite, UA 07/22/2024 Negative  Negative Final    Leukocytes, UA 07/22/2024 Negative  Negative Final    UNIT NUMBER 07/22/2024 [id number]   Final    Product Code 07/22/2024 [id number]   Final    DISPENSE STATUS 07/22/2024 TRANSFUSED   Final    CODING SYSTEM 07/22/2024 [id number]   Final    Unit Blood Type Code 07/22/2024 0600   Final    Unit Blood Type 07/22/2024 A NEG   Final    Unit Expiration 07/22/2024 [id number]   Final    CROSSMATCH INTERPRETATION 07/22/2024 Compatible   Final    Sodium 07/23/2024 141  136 - 145 mmol/L Final    Potassium 07/23/2024 4.5  3.5 - 5.1 mmol/L Final    Chloride 07/23/2024 108  95 - 110 mmol/L Final    CO2 07/23/2024 25  23 - 29 mmol/L Final    Glucose 07/23/2024 87  70 - 110 mg/dL Final    BUN 07/23/2024 42 (H)  10 - 30 mg/dL Final    Creatinine 07/23/2024 1.2  0.5 - 1.4 mg/dL Final    Calcium 07/23/2024 8.9  8.7 - 10.5 mg/dL Final    Total Protein 07/23/2024 6.4  6.0 - 8.4 g/dL Final    Albumin 07/23/2024 3.4 (L)  3.5 - 5.2 g/dL Final    Total Bilirubin 07/23/2024 0.9  0.1 - 1.0 mg/dL Final    Alkaline Phosphatase 07/23/2024 87  55 - 135 U/L Final    AST 07/23/2024 17  10 - 40 U/L Final    ALT 07/23/2024 13  10 - 44 U/L Final    eGFR 07/23/2024 41.2 (A)  >60 mL/min/1.73 m^2 Final    Anion Gap 07/23/2024 8  8 - 16 mmol/L Final    WBC 07/23/2024 11.33  3.90 - 12.70 K/uL Final    RBC 07/23/2024 3.32 (L)  4.00 - 5.40 M/uL Final     Hemoglobin 07/23/2024 10.1 (L)  12.0 - 16.0 g/dL Final    Hematocrit 07/23/2024 31.7 (L)  37.0 - 48.5 % Final    MCV 07/23/2024 96  82 - 98 fL Final    MCH 07/23/2024 30.4  27.0 - 31.0 pg Final    MCHC 07/23/2024 31.9 (L)  32.0 - 36.0 g/dL Final    RDW 07/23/2024 14.9 (H)  11.5 - 14.5 % Final    Platelets 07/23/2024 362  150 - 450 K/uL Final    MPV 07/23/2024 11.3  9.2 - 12.9 fL Final    Immature Granulocytes 07/23/2024 3.7 (H)  0.0 - 0.5 % Final    Gran # (ANC) 07/23/2024 8.6 (H)  1.8 - 7.7 K/uL Final    Immature Grans (Abs) 07/23/2024 0.42 (H)  0.00 - 0.04 K/uL Final    Lymph # 07/23/2024 1.3  1.0 - 4.8 K/uL Final    Mono # 07/23/2024 0.9  0.3 - 1.0 K/uL Final    Eos # 07/23/2024 0.0  0.0 - 0.5 K/uL Final    Baso # 07/23/2024 0.05  0.00 - 0.20 K/uL Final    nRBC 07/23/2024 0  0 /100 WBC Final    Gran % 07/23/2024 76.2 (H)  38.0 - 73.0 % Final    Lymph % 07/23/2024 11.4 (L)  18.0 - 48.0 % Final    Mono % 07/23/2024 7.9  4.0 - 15.0 % Final    Eosinophil % 07/23/2024 0.4  0.0 - 8.0 % Final    Basophil % 07/23/2024 0.4  0.0 - 1.9 % Final    Differential Method 07/23/2024 Automated   Final    Ferritin 07/23/2024 234  20.0 - 300.0 ng/mL Final    Vitamin B-12 07/23/2024 276  210 - 950 pg/mL Final    Folate 07/23/2024 5.0  4.0 - 24.0 ng/mL Final    Iron 07/23/2024 124  30 - 160 ug/dL Final    Transferrin 07/23/2024 219  200 - 375 mg/dL Final    TIBC 07/23/2024 324  250 - 450 ug/dL Final    Saturated Iron 07/23/2024 38  20 - 50 % Final    UNIT NUMBER 07/22/2024 [id number]   Preliminary    Product Code 07/22/2024 [id number]   Preliminary    DISPENSE STATUS 07/22/2024 CROSSMATCHED   Preliminary    CODING SYSTEM 07/22/2024 [id number]   Preliminary    Unit Blood Type Code 07/22/2024 0600   Preliminary    Unit Blood Type 07/22/2024 A NEG   Preliminary    Unit Expiration 07/22/2024 [id number]   Preliminary    CROSSMATCH INTERPRETATION 07/22/2024 Compatible   Preliminary    UNIT NUMBER 07/22/2024 [id number]   Final     Product Code 07/22/2024 [id number]   Final    DISPENSE STATUS 07/22/2024 RETURNED   Final    CODING SYSTEM 07/22/2024 [id number]   Final    Unit Blood Type Code 07/22/2024 0600   Final    Unit Blood Type 07/22/2024 A NEG   Final    Unit Expiration 07/22/2024 [id number]   Final    CROSSMATCH INTERPRETATION 07/22/2024 Compatible   Final    Sodium 07/24/2024 138  136 - 145 mmol/L Final    Potassium 07/24/2024 4.2  3.5 - 5.1 mmol/L Final    Chloride 07/24/2024 110  95 - 110 mmol/L Final    CO2 07/24/2024 22 (L)  23 - 29 mmol/L Final    Glucose 07/24/2024 93  70 - 110 mg/dL Final    BUN 07/24/2024 38 (H)  10 - 30 mg/dL Final    Creatinine 07/24/2024 1.3  0.5 - 1.4 mg/dL Final    Calcium 07/24/2024 8.1 (L)  8.7 - 10.5 mg/dL Final    Total Protein 07/24/2024 4.9 (L)  6.0 - 8.4 g/dL Final    Albumin 07/24/2024 2.7 (L)  3.5 - 5.2 g/dL Final    Total Bilirubin 07/24/2024 0.3  0.1 - 1.0 mg/dL Final    Alkaline Phosphatase 07/24/2024 79  55 - 135 U/L Final    AST 07/24/2024 12  10 - 40 U/L Final    ALT 07/24/2024 7 (L)  10 - 44 U/L Final    eGFR 07/24/2024 37.4 (A)  >60 mL/min/1.73 m^2 Final    Anion Gap 07/24/2024 6 (L)  8 - 16 mmol/L Final    WBC 07/24/2024 9.30  3.90 - 12.70 K/uL Final    RBC 07/24/2024 2.85 (L)  4.00 - 5.40 M/uL Final    Hemoglobin 07/24/2024 8.6 (L)  12.0 - 16.0 g/dL Final    Hematocrit 07/24/2024 27.1 (L)  37.0 - 48.5 % Final    MCV 07/24/2024 95  82 - 98 fL Final    MCH 07/24/2024 30.2  27.0 - 31.0 pg Final    MCHC 07/24/2024 31.7 (L)  32.0 - 36.0 g/dL Final    RDW 07/24/2024 15.9 (H)  11.5 - 14.5 % Final    Platelets 07/24/2024 269  150 - 450 K/uL Final    MPV 07/24/2024 11.2  9.2 - 12.9 fL Final    Immature Granulocytes 07/24/2024 3.2 (H)  0.0 - 0.5 % Final    Gran # (ANC) 07/24/2024 6.9  1.8 - 7.7 K/uL Final    Immature Grans (Abs) 07/24/2024 0.30 (H)  0.00 - 0.04 K/uL Final    Lymph # 07/24/2024 1.1  1.0 - 4.8 K/uL Final    Mono # 07/24/2024 0.9  0.3 - 1.0 K/uL Final    Eos # 07/24/2024 0.1  0.0 - 0.5  K/uL Final    Baso # 07/24/2024 0.02  0.00 - 0.20 K/uL Final    nRBC 07/24/2024 0  0 /100 WBC Final    Gran % 07/24/2024 74.4 (H)  38.0 - 73.0 % Final    Lymph % 07/24/2024 11.4 (L)  18.0 - 48.0 % Final    Mono % 07/24/2024 9.7  4.0 - 15.0 % Final    Eosinophil % 07/24/2024 1.1  0.0 - 8.0 % Final    Basophil % 07/24/2024 0.2  0.0 - 1.9 % Final    Differential Method 07/24/2024 Automated   Final        Meds   Current Facility-Administered Medications   Medication Dose Route Frequency Provider Last Rate Last Admin    0.9%  NaCl infusion (for blood administration)   Intravenous Q24H PRN SELAM Woo MD        acetaminophen tablet 1,000 mg  1,000 mg Oral Q8H Carrington Hutchinson MD   1,000 mg at 07/24/24 0912    amLODIPine tablet 5 mg  5 mg Oral Daily Erwin Lazar MD   5 mg at 07/24/24 0912    apixaban tablet 2.5 mg  2.5 mg Oral BID Carlos Romero MD   2.5 mg at 07/24/24 0912    aspirin chewable tablet 81 mg  81 mg Oral Daily Erwin Lazar MD   81 mg at 07/24/24 0912    dextrose 10% bolus 125 mL 125 mL  12.5 g Intravenous PRErwin Lock MD        dextrose 10% bolus 250 mL 250 mL  25 g Intravenous PRN Erwin Lazar MD        glucagon (human recombinant) injection 1 mg  1 mg Intramuscular PRErwin Lock MD        glucose chewable tablet 16 g  16 g Oral PRErwin Lock MD        glucose chewable tablet 24 g  24 g Oral PRErwin Lock MD        HYDROmorphone injection 0.2 mg  0.2 mg Intravenous Q4H PRN Carrington Hutchinson MD        latanoprost 0.005 % ophthalmic solution 1 drop  1 drop Both Eyes QHS Charlene Greene MD   1 drop at 07/23/24 2050    melatonin tablet 6 mg  6 mg Oral Nightly PRErwin Lock MD        memantine tablet 5 mg  5 mg Oral BID Erwin Lazar MD   5 mg at 07/24/24 0912    naloxone 0.4 mg/mL injection 0.02 mg  0.02 mg Intravenous PRN Erwin Lazar MD        ondansetron injection 4 mg  4 mg Intravenous Q8H PRN Erwin Lazar MD        oxyCODONE immediate  release tablet 5 mg  5 mg Oral Q6H PRN Carrington Hutchinson MD   5 mg at 07/24/24 1210    prochlorperazine injection Soln 5 mg  5 mg Intravenous Q6H PRN Erwin Lazar MD        QUEtiapine tablet 100 mg  100 mg Oral Nightly Erwin Lazar MD   100 mg at 07/23/24 2049    ropivacaine 0.2% Perineural Pump infusion 500 ML   Perineural Continuous Corrine Sloan MD   New Bag at 07/23/24 1007    senna-docusate 8.6-50 mg per tablet 1 tablet  1 tablet Oral Daily Erwin Lazar MD   1 tablet at 07/24/24 0912    sodium chloride 0.9% flush 10 mL  10 mL Intravenous PRN Erwin Lazar MD        timolol maleate 0.25% ophthalmic solution 1 drop  1 drop Both Eyes QHS Erwin Lazar MD   1 drop at 07/23/24 2100        Anticoagulant dose Apixaban 2.5mg BID.    Assessment:  89 yo F prior hip sx in which she developed afib during the hospitalization, she was on eliquis for 2 years before it was discontinued. She presents for above following a fall.    Pain well controlled     Plan:    1) Continue Femoral PNC at current infusion rate: 7mL q3h IB with 5mL q30m DB   2) Continue Tylenol 1000mg q8h  3) No PRNs required at this time.    Case discussed with staff, Dr. Zhou; final recommendations per attestation above.     Thank you for your consult and allowing us to participate in the care of this patient. We will continue to follow along. Please call the Acute Pain Service/Anesthesia if you have any questions or concerns.    Carrington Hutchinson MD  Department of Anesthesiology  [facility]  07/24/2024 1:37 PM

## 2024-07-24 NOTE — NURSING
Pt arrived to unit via bed, alert and calm. No c/o of pain or n/v. VSS. Daughters at bedside. Dressing c/d/I. Neurovascular assessments WNL. PNC in place. Pt/family oriented to room/unit.

## 2024-07-24 NOTE — PLAN OF CARE
Problem: Infection  Goal: Absence of Infection Signs and Symptoms  Outcome: Progressing     Problem: Adult Inpatient Plan of Care  Goal: Plan of Care Review  Outcome: Progressing  Goal: Patient-Specific Goal (Individualized)  Outcome: Progressing  Goal: Absence of Hospital-Acquired Illness or Injury  Outcome: Progressing  Goal: Optimal Comfort and Wellbeing  Outcome: Progressing  Goal: Readiness for Transition of Care  Outcome: Progressing     Problem: Fall Injury Risk  Goal: Absence of Fall and Fall-Related Injury  Outcome: Progressing     Problem: Skin Injury Risk Increased  Goal: Skin Health and Integrity  Outcome: Progressing     Problem: Acute Kidney Injury/Impairment  Goal: Fluid and Electrolyte Balance  Outcome: Progressing  Goal: Improved Oral Intake  Outcome: Progressing  Goal: Effective Renal Function  Outcome: Progressing     Problem: Wound  Goal: Optimal Coping  Outcome: Progressing  Goal: Optimal Functional Ability  Outcome: Progressing  Goal: Absence of Infection Signs and Symptoms  Outcome: Progressing  Goal: Improved Oral Intake  Outcome: Progressing  Goal: Optimal Pain Control and Function  Outcome: Progressing  Goal: Skin Health and Integrity  Outcome: Progressing  Goal: Optimal Wound Healing  Outcome: Progressing

## 2024-07-24 NOTE — ASSESSMENT & PLAN NOTE
-Cr 1.7 on presentation, baseline unclear. IV fluids ordered  on admit and stopped now, and at 1.2 so likely was just an Jonathan now resolved

## 2024-07-24 NOTE — SUBJECTIVE & OBJECTIVE
Interval history- she reports feeling okay so far this morning, ate a little of breakfast, ate her banana and about 4 bites of [place] toaste and 3 bites of eggs per daughters at bedside and they brought some ensure from home to have her drink as well between meals. Bp improved now on just home norvasc and if elevates again can consider restarting home lisinopril which hospice just stopped recently. Hg stable post op, got 1 U pre op as 8 on admit and iron studies wnl. She asked when she can go home and let her know we want to practice transfers, scooting in bed as km is bedbound/wheelchair bound but do a little therapy for prior level of fuction and watch pain control so likely in next day or 2 with plan to resume home hospice. Gary removal placed today. Sandhya in agreemenet with plan and 1 of them plans to stay at bedside through whole stay with her and taking turns.  Review of patient's allergies indicates:  No Known Allergies    No current facility-administered medications on file prior to encounter.     Current Outpatient Medications on File Prior to Encounter   Medication Sig    amLODIPine (NORVASC) 5 MG tablet Take 5 mg by mouth once daily.    aspirin 81 MG Chew Take 81 mg by mouth once daily.    memantine (NAMENDA) 5 MG Tab Take 5 mg by mouth 2 (two) times daily.    QUEtiapine (SEROQUEL) 100 MG Tab Take 50 mg by mouth. Take 1/2 pill (50 mg) in am and a whole pill QHS (100 mg)    [DISCONTINUED] timoloL 0.25 % ophthalmic solution 1-2 drops 2 (two) times daily.     Family History    None       Tobacco Use    Smoking status: Not on file    Smokeless tobacco: Not on file   Substance and Sexual Activity    Alcohol use: Not on file    Drug use: Not on file    Sexual activity: Not on file     Review of Systems   Unable to perform ROS: Dementia     Objective:     Vital Signs (Most Recent):  Temp: 97 °F (36.1 °C) (07/24/24 0759)  Pulse: 89 (07/24/24 0759)  Resp: 18 (07/24/24 0455)  BP: 135/63 (07/24/24  0759)  SpO2: (!) 91 % (07/24/24 0759) Vital Signs (24h Range):  Temp:  [97 °F (36.1 °C)-98.4 °F (36.9 °C)] 97 °F (36.1 °C)  Pulse:  [89-96] 89  Resp:  [14-69] 18  SpO2:  [91 %-99 %] 91 %  BP: (126-153)/(58-82) 135/63     Weight: 72.6 kg (160 lb)  Body mass index is 27.46 kg/m².     Physical Exam  Vitals reviewed.   Constitutional:       General: She is awake. She is not in acute distress.     Appearance: She is underweight.   HENT:      Head: Normocephalic and atraumatic.   Eyes:      Extraocular Movements: Extraocular movements intact.      Pupils: Pupils are equal, round, and reactive to light.   Neck:      Vascular: No JVD.      Trachea: No tracheal deviation.   Cardiovascular:      Rate and Rhythm: Normal rate and regular rhythm.      Heart sounds: No murmur heard.     No friction rub. No gallop.   Pulmonary:      Effort: No respiratory distress.      Breath sounds: Normal breath sounds. No wheezing or rales.   Abdominal:      General: Bowel sounds are normal. There is no distension.      Palpations: Abdomen is soft. There is no mass.      Tenderness: There is no abdominal tenderness.   Musculoskeletal:      Cervical back: Neck supple.      Comments: Bandages to LLE. PNC in place.   Lymphadenopathy:      Cervical: No cervical adenopathy.   Skin:     General: Skin is warm and dry.      Findings: No rash.   Neurological:      Mental Status: She is disoriented.              CRANIAL NERVES     CN III, IV, VI   Pupils are equal, round, and reactive to light.       Significant Labs: All pertinent labs within the past 24 hours have been reviewed.    Significant Imaging: I have reviewed all pertinent imaging results/findings within the past 24 hours.

## 2024-07-24 NOTE — PT/OT/SLP EVAL
Occupational Therapy   Evaluation    Name: Barby Burrell  MRN: [id number]  Admitting Diagnosis: Closed fracture of left distal femur  Recent Surgery: Procedure(s) (LRB):  ORIF, FRACTURE, DISTAL FEMUR (Left) 1 Day Post-Op    Recommendations:     Discharge Recommendations: Low Intensity Therapy  Discharge Equipment Recommendations:  none  Barriers to discharge:  None    Assessment:   CO-TX with PT for pt safety and max participation with both disciplines.     Barby Burrell is a 89 y.o. female with a medical diagnosis of Closed fracture of left distal femur.  She presents with AMS and fatigue. Performance deficits affecting function: weakness, impaired endurance, impaired self care skills, impaired functional mobility, gait instability, impaired balance, impaired cognition, impaired coordination, decreased coordination, decreased upper extremity function, decreased lower extremity function, decreased safety awareness, pain, impaired skin.  PTA, pt was stated to be bedbound to w/c with assistance and is dependent with most ADLs. Upon eval, pt is near baseline but has poor balance that poses safety concerns without assistance.    Rehab Prognosis: Good; patient would benefit from acute skilled OT services to address these deficits and reach maximum level of function.       Plan:     Patient to be seen 3 x/week to address the above listed problems via self-care/home management, therapeutic activities, therapeutic exercises, neuromuscular re-education, cognitive retraining  Plan of Care Expires: 08/23/24  Plan of Care Reviewed with: daughter    Subjective     Chief Complaint: Sore LLE  Patient/Family Comments/goals: return home    Occupational Profile:  Living Environment: Lives at [facility] but planning to DC to pt dtr home for home hospice.   Previous level of function: Dependent for ADLs  Equipment Used at Home: wheelchair, walker, rolling, 3-in-1 commode  Assistance upon Discharge:  Family    Pain/Comfort:  Pain Rating 1:  (not rated)  Location - Side 1: Left  Location 1: leg  Pain Addressed 1: Reposition, Distraction, Cessation of Activity    Patients cultural, spiritual, Spiritism conflicts given the current situation: no    Objective:     Communicated with: Nsg prior to session.  Patient found HOB elevated with peripheral IV, [place] catheter upon OT entry to room.    General Precautions: Standard, aspiration  Orthopedic Precautions: LLE weight bearing as tolerated  Braces: N/A  Respiratory Status: Room air    Occupational Performance:    Bed Mobility:    Patient completed Supine to Sit with maximal assistance and 2 persons  Patient completed Sit to Supine with maximal assistance and 2 persons    Functional Mobility/Transfers:  Patient completed Sit <> Stand Transfer with total assistance and of 2 persons  with  no assistive device and hand-held assist ; PT mad minimal to no lift-off    Activities of Daily Living:  Grooming: stand by assistance facial Hygiene seated EOB  Upper Body Dressing: moderate assistance don gown for backside    Cognitive/Visual Perceptual:  A&O x name, place, situation    Physical Exam:  BUE WFL for ROM and MMT  Pt requires max assist for sitting balance    AMPAC 6 Click ADL:  AMPAC Total Score: 10    Treatment & Education:  Pt educated on role and purpose of therapy  Pt educated on goal setting  Pt educated on benefits of OOB activity  Pt educated on self advocacy     Patient left HOB elevated with all lines intact, call button in reach, nsg notified, and dtr present    GOALS:   Multidisciplinary Problems       Occupational Therapy Goals          Problem: Occupational Therapy    Goal Priority Disciplines Outcome Interventions   Occupational Therapy Goal     OT, PT/OT Progressing    Description: Goals to be met by: 8/23/24     Patient will increase functional independence with ADLs by performing:    Grooming while seated with Set-up Assistance and Stand-by  Assistance.  Toileting from bedside commode with Moderate Assistance for hygiene and clothing management.   All functional transfers performed with Mod A                         History:     No past medical history on file.      Past Surgical History:   Procedure Laterality Date    ORIF FEMUR FRACTURE Left 7/23/2024    Procedure: ORIF, FRACTURE, DISTAL FEMUR;  Surgeon: Kam Navarro MD;  Location: [facility] OR [address] [place] [address];  Service: Orthopedics;  Laterality: Left;       Time Tracking:     OT Date of Treatment: 07/24/24  OT Start Time: 0937  OT Stop Time: 1006  OT Total Time (min): 29 min    Billable Minutes:Evaluation 15  Therapeutic Activity 14    7/24/2024

## 2024-07-24 NOTE — PLAN OF CARE
[facility]  Department of Hospital Medicine  [address] [place] [address]  [phone] after hours  [phone] fax    HOSPICE  ORDERS    07/26/2024    Admit to [facility]:  Home Service     Diagnoses:   Active Hospital Problems    Diagnosis  POA    *Closed displaced supracondylar fracture of distal end of left femur with intracondylar extension with routine healing s/p ORIF on 7/23/2024 [S72.138R]  Not Applicable     Priority: 1 - High    Paroxysmal atrial fibrillation [I48.0]  Yes     Priority: 2     Primary hypertension [I10]  Yes     Priority: 3     Normocytic anemia [D64.9]  Yes     Priority: 4     Acute kidney injury [N17.9]  Yes     Priority: 5     Severe late onset Alzheimer's dementia without behavioral disturbance, psychotic disturbance, mood disturbance, or anxiety [G30.1, F02.C0]  Yes     Priority: 6     Hospice care patient [Z51.5]  Not Applicable     Priority: 7     Primary open angle glaucoma (POAG) of both eyes, mild stage [H40.1131]  Yes     Priority: 8     ACP (advance care planning) [Z71.89]  Not Applicable     Priority: 9     Closed supracondylar fracture of left femur [S72.711K]  Yes      Resolved Hospital Problems   No resolved problems to display.       Hospice Qualifying Diagnoses: dementia, bedbound status       Patient has a life expectancy < 6 months due to:  Primary Hospice Diagnosis: End stage advanced Alzheimer's dementia  Comorbid Conditions Contributing to Decline: Closed distal femur fracture and bed bound status     Vital Signs: Routine per Hospice Protocol.      Allergies: Review of patient's allergies indicates:  No Known Allergies    Diet: Regular diet    Activities: Weight bear as tolerated and range of motion as tolerated to left lower extremity     Goals of Care Treatment Preferences:  Code Status: DNR      Nursing: Per Hospice Routine.       Routine Skin for Bedridden Patients: Apply moisture barrier cream to all  skin folds and wet areas in perineal area daily and after baths and all bowel movements.    Keep bandages to left lower extremity in place until Ortho clinic follow up. Do not get wet or immerse in water.        Medications:          Medication List        START taking these medications      acetaminophen 500 MG tablet  Commonly known as: TYLENOL  Take 2 tablets (1,000 mg total) by mouth every 8 (eight) hours.     apixaban 2.5 mg Tab  Commonly known as: ELIQUIS  Take 1 tablet (2.5 mg total) by mouth 2 (two) times daily.      latanoprost 0.005 % ophthalmic solution  Place 1 drop into both eyes every evening.     melatonin 3 mg tablet  Commonly known as: MELATIN  Take 2 tablets (6 mg total) by mouth nightly as needed for Insomnia.     oxyCODONE 5 MG immediate release tablet  Commonly known as: ROXICODONE  Take 1 tablet (5 mg total) by mouth every 4 (four) hours as needed (Moderate to severe pain).     senna-docusate 8.6-50 mg 8.6-50 mg per tablet  Commonly known as: PERICOLACE  Take 1 tablet by mouth once daily.            CHANGE how you take these medications      timolol maleate 0.25% 0.25 % Drop  Commonly known as: TIMOPTIC  Place 1 drop into both eyes every evening.            CONTINUE taking these medications      amLODIPine 5 MG tablet  Commonly known as: NORVASC  Take 5 mg by mouth once daily.     aspirin 81 MG Chew  Take 81 mg by mouth once daily.     memantine 5 MG Tab  Commonly known as: NAMENDA  Take 5 mg by mouth 2 (two) times daily.     QUEtiapine 100 MG Tab  Commonly known as: SEROQUEL  Take 50 mg by mouth. Take 1/2 pill (50 mg) in am and a whole pill QHS (100 mg)                Future Orders:  Hospice Medical Director may dictate new orders for comfortable care measures & sign death certificate.      Future Appointments   Date Time Provider Department Center   8/6/2024  9:45 AM Serena, Uziel K, NP [facility] [address]   9/3/2024  9:30 AM Serena, Uziel K, NP [facility] [address]        _________________________________  Pinky Adame, MD  07/26/2024

## 2024-07-24 NOTE — PROGRESS NOTES
[facility] Medicine  Progress Note    Patient Name: Barby Burrell  MRN: [id number]  Patient Class: IP- Inpatient   Admission Date: 7/22/2024  Length of Stay: 2 days  Attending Physician: Charlene Greene MD  Primary Care Provider: Lori, Primary Doctor        Subjective:     Principal Problem:Closed fracture of left distal femur        HPI:  89 yo F with PMHx dementia and HLD who presents to the ED from care facility with L leg pain x a few days. Pt. Alert to person and occasionally polace, but she is unable to provide history. All history obtained per daughter at bedside. Per daughter, pt. Was last seen normal Thursday evening when her sister visited her. On Friday morning she received a call stating that the patient had been complaing of L leg pain and had a swollen knee. Pt. Currently on hospice, but given the apparent acute injury, an X-ray was obtained which revealed a distal femur fracture. Case was discussed with family who decided to send pt. To ED for orthopedic surgery evaluation and potential surgery.    Per family, at baseline pt. Is mostly wheelchair bound. She previously lived in [place] with 24/hr care givers until family decided recently to move her to a healthcare facility with hospice care closer to their home just a few weeks ago.    Overview/Hospital Course:  No notes on file    Interval history- she reports feeling okay so far this morning, ate a little of breakfast, ate her banana and about 4 bites of English toaste and 3 bites of eggs per daughters at bedside and they brought some ensure from home to have her drink as well between meals. Bp improved now on just home norvasc and if elevates again can consider restarting home lisinopril which hospice just stopped recently. Hg stable post op, got 1 U pre op as 8 on admit and iron studies wnl. She asked when she can go home and let her know we want to practice transfers, scooting in bed as basleline is bedbound/wheelchair bound  but do a little therapy for prior level of fuction and watch pain control so likely in next day or 2 with plan to resume home hospice. Gary removal placed today. Sandhya in agreemenet with plan and 1 of them plans to stay at bedside through whole stay with her and taking turns.  Review of patient's allergies indicates:  No Known Allergies    No current facility-administered medications on file prior to encounter.     Current Outpatient Medications on File Prior to Encounter   Medication Sig    amLODIPine (NORVASC) 5 MG tablet Take 5 mg by mouth once daily.    aspirin 81 MG Chew Take 81 mg by mouth once daily.    memantine (NAMENDA) 5 MG Tab Take 5 mg by mouth 2 (two) times daily.    QUEtiapine (SEROQUEL) 100 MG Tab Take 50 mg by mouth. Take 1/2 pill (50 mg) in am and a whole pill QHS (100 mg)    [DISCONTINUED] timoloL 0.25 % ophthalmic solution 1-2 drops 2 (two) times daily.     Family History    None       Tobacco Use    Smoking status: Not on file    Smokeless tobacco: Not on file   Substance and Sexual Activity    Alcohol use: Not on file    Drug use: Not on file    Sexual activity: Not on file     Review of Systems   Unable to perform ROS: Dementia     Objective:     Vital Signs (Most Recent):  Temp: 97 °F (36.1 °C) (07/24/24 0759)  Pulse: 89 (07/24/24 0759)  Resp: 18 (07/24/24 0455)  BP: 135/63 (07/24/24 0759)  SpO2: (!) 91 % (07/24/24 0759) Vital Signs (24h Range):  Temp:  [97 °F (36.1 °C)-98.4 °F (36.9 °C)] 97 °F (36.1 °C)  Pulse:  [89-96] 89  Resp:  [14-69] 18  SpO2:  [91 %-99 %] 91 %  BP: (126-153)/(58-82) 135/63     Weight: 72.6 kg (160 lb)  Body mass index is 27.46 kg/m².     Physical Exam  Vitals reviewed.   Constitutional:       General: She is awake. She is not in acute distress.     Appearance: She is underweight.   HENT:      Head: Normocephalic and atraumatic.   Eyes:      Extraocular Movements: Extraocular movements intact.      Pupils: Pupils are equal, round, and reactive to light.   Neck:       Vascular: No JVD.      Trachea: No tracheal deviation.   Cardiovascular:      Rate and Rhythm: Normal rate and regular rhythm.      Heart sounds: No murmur heard.     No friction rub. No gallop.   Pulmonary:      Effort: No respiratory distress.      Breath sounds: Normal breath sounds. No wheezing or rales.   Abdominal:      General: Bowel sounds are normal. There is no distension.      Palpations: Abdomen is soft. There is no mass.      Tenderness: There is no abdominal tenderness.   Musculoskeletal:      Cervical back: Neck supple.      Comments: Bandages to LLE. PNC in place.   Lymphadenopathy:      Cervical: No cervical adenopathy.   Skin:     General: Skin is warm and dry.      Findings: No rash.   Neurological:      Mental Status: She is disoriented.              CRANIAL NERVES     CN III, IV, VI   Pupils are equal, round, and reactive to light.       Significant Labs: All pertinent labs within the past 24 hours have been reviewed.    Significant Imaging: I have reviewed all pertinent imaging results/findings within the past 24 hours.    Assessment/Plan:      * Closed fracture of left distal femur  -found on xrays at home from hospice company to assess for deformity, wheelchair/bedbound at baseline but can still perform transfers to go to restroom  -ortho consulted, and went on 7/24 to OR with dr brooks for Open reduction internal fixation left femoral shaft fracture with retrograde intramedullary nail   Open reduction internal fixation left distal femur supracondylar fracture, with intra-articular extension utilizing independent screw  Removal of superficial wire left proximal tibia  -wbat/rom post op and goal for transfers/prior level of function and pain control as family with realistic goals with plan for home hospice long term again, may need more DME via [employer] pending PT sessions (martha lift possibly? Will f.u recs)  -pain control with multimodals with anesthesia managing  -ireland out  today  -eliquis for dvt ppx for 35 days, end date aug 28      Paroxysmal atrial fibrillation  Patient with Paroxysmal (<7 days) atrial fibrillation which is controlled currently with  none . Patient is currently in sinus rhythm.[id number] Score: 3. Anticoagulation not indicated due to had no return of afib since prev hip fx surgery a few years ago when she had it happen post op and resolved and no issues sinec so stopped eliquis a few years ago and has been on only asa ppx since then. Fam okay with doing eliquis for dvt ppx for 1 month now and then stop. Telemetry, no afib so far during this stay.  .    Glaucoma  Continue 2 eye drops      HTN (hypertension)  -Continue home amlodipine  Was recently taken off lisinopril by hospice so if spikes up can consdier resuming but doing okay so far today    Normocytic anemia  -Hgb 8.4 on presentation, baseline unclear. Anemia work-up ordered. 1 unit ordered pre-op per ortho and improved to 10 after this, iron studies/b12/folate wnl.    Acute kidney injury  -Cr 1.7 on presentation, baseline unclear. IV fluids ordered  on admit and stopped now, and at 1.2 so likely was just an Jonathan now resolved      Dementia without behavioral disturbance, psychotic disturbance, mood disturbance, or anxiety  -Mentation at baseline per family. Delirium precautions ordered  -Continue home seroquel and namenda- aleks requested to hold Am seroquel while here and can resume prev regimen once home      VTE Risk Mitigation (From admission, onward)           Ordered     apixaban tablet 2.5 mg  2 times daily         07/23/24 1016     Place sequential compression device  Until discontinued         07/23/24 0538     Place sequential compression device  Until discontinued         07/22/24 1950                    Discharge Planning   KONSTANTIN: 7/26/2024     Code Status: DNR   Is the patient medically ready for discharge?:     Reason for patient still in hospital (select all that apply): Patient trending  condition  Discharge Plan A: Home Health, Hospice/home   Discharge Delays: None known at this time (patient's [facility]              Charlene Greene MD  Department of Hospital Medicine   [facility]

## 2024-07-24 NOTE — SUBJECTIVE & OBJECTIVE
"Principal Problem:Closed fracture of left distal femur    Principal Orthopedic Problem: as above, s/p retrograde IMN 07/23    Interval History: Pt seen and examined at bedside. FLORECITA GUNDERSON. Reports no new symptoms. Denies fevers or chill.       Review of patient's allergies indicates:  No Known Allergies    Current Facility-Administered Medications   Medication    0.9%  NaCl infusion (for blood administration)    amLODIPine tablet 5 mg    apixaban tablet 2.5 mg    aspirin chewable tablet 81 mg    ceFAZolin 2 g in D5W 50 mL IVPB (MB+)    dextrose 10% bolus 125 mL 125 mL    dextrose 10% bolus 250 mL 250 mL    glucagon (human recombinant) injection 1 mg    glucose chewable tablet 16 g    glucose chewable tablet 24 g    HYDROmorphone injection 0.2 mg    latanoprost 0.005 % ophthalmic solution 1 drop    melatonin tablet 6 mg    memantine tablet 5 mg    naloxone 0.4 mg/mL injection 0.02 mg    ondansetron injection 4 mg    oxyCODONE immediate release tablet 5 mg    prochlorperazine injection Soln 5 mg    QUEtiapine tablet 100 mg    ropivacaine 0.2% Perineural Pump infusion 500 ML    senna-docusate 8.6-50 mg per tablet 1 tablet    sodium chloride 0.9% flush 10 mL    timolol maleate 0.25% ophthalmic solution 1 drop     Objective:     Vital Signs (Most Recent):  Temp: 97.6 °F (36.4 °C) (07/24/24 0455)  Pulse: 95 (07/24/24 0455)  Resp: 18 (07/24/24 0455)  BP: 126/60 (07/24/24 0455)  SpO2: 96 % (07/24/24 0455) Vital Signs (24h Range):  Temp:  [97.3 °F (36.3 °C)-98.4 °F (36.9 °C)] 97.6 °F (36.4 °C)  Pulse:  [77-98] 95  Resp:  [12-69] 18  SpO2:  [93 %-100 %] 96 %  BP: (126-182)/(58-85) 126/60     Weight: 72.6 kg (160 lb)  Height: 5' 4" (162.6 cm)  Body mass index is 27.46 kg/m².      Intake/Output Summary (Last 24 hours) at 7/24/2024 0630  Last data filed at 7/23/2024 1817  Gross per 24 hour   Intake 1000 ml   Output 755 ml   Net 245 ml        Ortho/SPM Exam     AAOx1  NAD  Reg rate  No increased WOB    LLE    Dressing C/D/I "   Compartments soft/compressible  Ankle dorsiflexion & plantarflexion intact  Pt no responsive to sensation exam  Brisk cap refill            Significant Labs: All pertinent labs within the past 24 hours have been reviewed.    Significant Imaging: I have reviewed and interpreted all pertinent imaging results/findings.

## 2024-07-24 NOTE — ASSESSMENT & PLAN NOTE
-Mentation at baseline per family. Delirium precautions ordered  -Continue home seroquel and namenda- fam requested to hold Am seroquel while here and can resume prev regimen once home

## 2024-07-25 PROBLEM — G30.1 SEVERE LATE ONSET ALZHEIMER'S DEMENTIA WITHOUT BEHAVIORAL DISTURBANCE, PSYCHOTIC DISTURBANCE, MOOD DISTURBANCE, OR ANXIETY: Status: ACTIVE | Noted: 2024-07-22

## 2024-07-25 PROBLEM — F02.C0 SEVERE LATE ONSET ALZHEIMER'S DEMENTIA WITHOUT BEHAVIORAL DISTURBANCE, PSYCHOTIC DISTURBANCE, MOOD DISTURBANCE, OR ANXIETY: Status: ACTIVE | Noted: 2024-07-22

## 2024-07-25 LAB
ALBUMIN SERPL BCP-MCNC: 2.5 G/DL (ref 3.5–5.2)
ALP SERPL-CCNC: 83 U/L (ref 55–135)
ALT SERPL W/O P-5'-P-CCNC: <5 U/L (ref 10–44)
ANION GAP SERPL CALC-SCNC: 4 MMOL/L (ref 8–16)
AST SERPL-CCNC: 11 U/L (ref 10–40)
BASOPHILS # BLD AUTO: 0.04 K/UL (ref 0–0.2)
BASOPHILS NFR BLD: 0.5 % (ref 0–1.9)
BILIRUB SERPL-MCNC: 0.4 MG/DL (ref 0.1–1)
BUN SERPL-MCNC: 35 MG/DL (ref 10–30)
CALCIUM SERPL-MCNC: 8.3 MG/DL (ref 8.7–10.5)
CHLORIDE SERPL-SCNC: 109 MMOL/L (ref 95–110)
CO2 SERPL-SCNC: 24 MMOL/L (ref 23–29)
CREAT SERPL-MCNC: 1.1 MG/DL (ref 0.5–1.4)
DIFFERENTIAL METHOD BLD: ABNORMAL
EOSINOPHIL # BLD AUTO: 0.3 K/UL (ref 0–0.5)
EOSINOPHIL NFR BLD: 3.1 % (ref 0–8)
ERYTHROCYTE [DISTWIDTH] IN BLOOD BY AUTOMATED COUNT: 15.8 % (ref 11.5–14.5)
EST. GFR  (NO RACE VARIABLE): 45.7 ML/MIN/1.73 M^2
GLUCOSE SERPL-MCNC: 89 MG/DL (ref 70–110)
HCT VFR BLD AUTO: 26.6 % (ref 37–48.5)
HGB BLD-MCNC: 8.3 G/DL (ref 12–16)
IMM GRANULOCYTES # BLD AUTO: 0.36 K/UL (ref 0–0.04)
IMM GRANULOCYTES NFR BLD AUTO: 4.3 % (ref 0–0.5)
LYMPHOCYTES # BLD AUTO: 1.4 K/UL (ref 1–4.8)
LYMPHOCYTES NFR BLD: 16.2 % (ref 18–48)
MAGNESIUM SERPL-MCNC: 2 MG/DL (ref 1.6–2.6)
MCH RBC QN AUTO: 30 PG (ref 27–31)
MCHC RBC AUTO-ENTMCNC: 31.2 G/DL (ref 32–36)
MCV RBC AUTO: 96 FL (ref 82–98)
MONOCYTES # BLD AUTO: 0.7 K/UL (ref 0.3–1)
MONOCYTES NFR BLD: 8 % (ref 4–15)
NEUTROPHILS # BLD AUTO: 5.7 K/UL (ref 1.8–7.7)
NEUTROPHILS NFR BLD: 67.9 % (ref 38–73)
NRBC BLD-RTO: 0 /100 WBC
PHOSPHATE SERPL-MCNC: 2.4 MG/DL (ref 2.7–4.5)
PLATELET # BLD AUTO: 279 K/UL (ref 150–450)
PMV BLD AUTO: 11 FL (ref 9.2–12.9)
POTASSIUM SERPL-SCNC: 3.9 MMOL/L (ref 3.5–5.1)
PROT SERPL-MCNC: 4.8 G/DL (ref 6–8.4)
RBC # BLD AUTO: 2.77 M/UL (ref 4–5.4)
SODIUM SERPL-SCNC: 137 MMOL/L (ref 136–145)
WBC # BLD AUTO: 8.42 K/UL (ref 3.9–12.7)

## 2024-07-25 PROCEDURE — 11000001 HC ACUTE MED/SURG PRIVATE ROOM

## 2024-07-25 PROCEDURE — 80053 COMPREHEN METABOLIC PANEL: CPT | Performed by: HOSPITALIST

## 2024-07-25 PROCEDURE — 25000003 PHARM REV CODE 250

## 2024-07-25 PROCEDURE — 84100 ASSAY OF PHOSPHORUS: CPT | Performed by: HOSPITALIST

## 2024-07-25 PROCEDURE — 97535 SELF CARE MNGMENT TRAINING: CPT

## 2024-07-25 PROCEDURE — 83735 ASSAY OF MAGNESIUM: CPT | Performed by: HOSPITALIST

## 2024-07-25 PROCEDURE — 25000003 PHARM REV CODE 250: Performed by: HOSPITALIST

## 2024-07-25 PROCEDURE — 36415 COLL VENOUS BLD VENIPUNCTURE: CPT | Performed by: HOSPITALIST

## 2024-07-25 PROCEDURE — 97530 THERAPEUTIC ACTIVITIES: CPT

## 2024-07-25 PROCEDURE — 85025 COMPLETE CBC W/AUTO DIFF WBC: CPT | Performed by: HOSPITALIST

## 2024-07-25 RX ADMIN — MEMANTINE 5 MG: 5 TABLET ORAL at 08:07

## 2024-07-25 RX ADMIN — TIMOLOL MALEATE 1 DROP: 2.5 SOLUTION/ DROPS OPHTHALMIC at 09:07

## 2024-07-25 RX ADMIN — ACETAMINOPHEN 1000 MG: 500 TABLET ORAL at 04:07

## 2024-07-25 RX ADMIN — APIXABAN 2.5 MG: 2.5 TABLET, FILM COATED ORAL at 09:07

## 2024-07-25 RX ADMIN — ACETAMINOPHEN 1000 MG: 500 TABLET ORAL at 10:07

## 2024-07-25 RX ADMIN — ACETAMINOPHEN 1000 MG: 500 TABLET ORAL at 08:07

## 2024-07-25 RX ADMIN — QUETIAPINE FUMARATE 100 MG: 100 TABLET ORAL at 08:07

## 2024-07-25 RX ADMIN — LATANOPROST 1 DROP: 50 SOLUTION OPHTHALMIC at 09:07

## 2024-07-25 RX ADMIN — APIXABAN 2.5 MG: 2.5 TABLET, FILM COATED ORAL at 08:07

## 2024-07-25 RX ADMIN — ASPIRIN 81 MG CHEWABLE TABLET 81 MG: 81 TABLET CHEWABLE at 09:07

## 2024-07-25 RX ADMIN — MEMANTINE 5 MG: 5 TABLET ORAL at 09:07

## 2024-07-25 RX ADMIN — SENNOSIDES AND DOCUSATE SODIUM 1 TABLET: 50; 8.6 TABLET ORAL at 09:07

## 2024-07-25 RX ADMIN — AMLODIPINE BESYLATE 5 MG: 5 TABLET ORAL at 09:07

## 2024-07-25 NOTE — ASSESSMENT & PLAN NOTE
Patient with known advanced, severe Alzheimer's type dementia on home hospice at home for her dementia. Patient at cognitive baseline currently per family. Patient on Seroquel and Namenda at home to treat and will continue in hospital. Patient placed on delirium precautions in hospital. Please avoid any benzodiazepines as can worsen delirium and only should use in delirium caused by benzodiazepine or alcohol withdrawal. Please avoid all anticholinergic medications as can worsen delirium. Please keep curtains and blinds open during the day and closed during the night. Please continue to have nursing and family reorient patient and encourage family to visit and stay at night if possible. Please avoid physical restraints if possible as likely to worsen and increase agitation in delirious patient. No prn anti-psychotics indicated at this time.

## 2024-07-25 NOTE — ASSESSMENT & PLAN NOTE
Resolved. Patient on admit with Creatinine 1.7 consistent with GIANA. Present on admit. Patient given IVF's and Creatinine improved to 1.2 on 7/23 and stable at 1.1 on 7/25.

## 2024-07-25 NOTE — ASSESSMENT & PLAN NOTE
Chronic, controlled. Latest blood pressure and vitals reviewed-     Temp:  [97.4 °F (36.3 °C)-99 °F (37.2 °C)]   Pulse:  [77-87]   Resp:  [18-20]   BP: (121-169)/(58-74)   SpO2:  [93 %-97 %] .   Home meds for hypertension were reviewed and noted below.   Hypertension Medications               amLODIPine (NORVASC) 5 MG tablet Take 5 mg by mouth once daily.            While in the hospital, will manage blood pressure as follows; Continue home antihypertensive regimen of Norvasc in hospital. Patient was recently taken off Lisinopril by her hospice agency and no indication needs to be restarted.     Will utilize p.r.n. blood pressure medication only if patient's blood pressure greater than 180/110 and she develops symptoms such as worsening chest pain or shortness of breath.

## 2024-07-25 NOTE — HOSPITAL COURSE
Patient was seen and evaluated by Orthopedic surgery who recommended operative repair of closed distal left femoral shaft fracture and closed left distal femur supracondylar fracture. Patient was medically optimized prior to surgery and was taken to OR after optimization on 7/23/2024. Patient underwent ORIF of left femoral shaft fracture with retrograde intramedullary nail and ORIF of left distal femur supracondylar fracture with intra-articular extension utilizing independent screw by Dr. Kam Navarro. Post-op patient weight bear as tolerated to the left lower extremity as per Orthopedics recommendation. Patient placed on Apixiban 2.5 mg po BID and BHAKTI/SCD's for DVT prophylaxis post-op and will need for total of 30 days. Perineural pain catheter placed by Anesthesia Pain Service with continuous infusion of Ropivacaine to help with pain control post-op and Anesthesia Pain Service managing while patient in the hospital. Patient placed on multimodal pain management post-op with Tylenol 1000 mg po every 8 hours post-op and will continue as pain controlled with that regimen. PT/OT consulted post-op and evaluated patient and recommended low level intensity. Social work/case management discussed with daughters and thy would like patient to return home with home hospice s doing prior to this admit with DME. Patient to go to her daughter's house with home hospice on discharge and anticipate home discharge on 7/26. PNC removed prior to discharge. Pain controlled at time of discharge. Daughters main goal is comfort for patient on discharge and thus discharged home with [facility] to her daughter's house as per family request. DME delivered to daughter's house prior to discharge for wheelchair, Chacorta lift and hospital bed as per family request. Family advised that surgical bandage to remain in place until Orthopedic clinic follow-up and they are aware. They were advised not to get bandage wet. Patient  discharged on 30 days of Apixiban for DVT prophylaxis. Patient discharged on Tylenol and Oxycodone prn for pain.

## 2024-07-25 NOTE — SUBJECTIVE & OBJECTIVE
Interval History: One of patient's daughters at bedside. PT/OT consulted post-op and evaluated patient and recommended low level intensity. Social work/case management discussed with daughters and they would like patient to return home with home hospice as doing prior to this admit with DME. Patient to go to her daughter, Mariza's house with home hospice on discharge and anticipate home discharge on 7/26. Family in agreement with plan. So far pain is controlled to left leg and patient appeared comfortable on exam that her daughter stated was family's primary goal so for her mother to be comfortable. Daughter noted increased swelling to letf foot and lower leg. ACE bandage in place from left mid thigh to ankle but was noted to have edema to left foot noted and could be from ACE bandage and also could just be normal post-op swelling to surgical leg and will monitor for now. Labs reviewed. Hgb mildly decreased from 8.6 yesterday to 8.3 today. Patient with no clinical signs of bleeding. Creatinine stable at 1.1. BP well controlled.     Review of Systems   Unable to perform ROS: Dementia     Objective:     Vital Signs (Most Recent):  Temp: 98.4 °F (36.9 °C) (07/25/24 1544)  Pulse: 87 (07/25/24 1544)  Resp: 18 (07/25/24 1544)  BP: 139/63 (07/25/24 1544)  SpO2: 95 % (07/25/24 1544) on room air Vital Signs (24h Range):  Temp:  [97.4 °F (36.3 °C)-99 °F (37.2 °C)] 98.4 °F (36.9 °C)  Pulse:  [77-87] 87  Resp:  [18-20] 18  SpO2:  [93 %-97 %] 95 %  BP: (121-169)/(58-74) 139/63     Weight: 72.6 kg (160 lb 0.9 oz)  Body mass index is 27.47 kg/m².    Intake/Output Summary (Last 24 hours) at 7/25/2024 1603  Last data filed at 7/25/2024 1332  Gross per 24 hour   Intake 480 ml   Output --   Net 480 ml         Physical Exam  Vitals and nursing note reviewed.   Constitutional:       General: She is awake. She is not in acute distress.     Appearance: Normal appearance. She is normal weight. She is not ill-appearing.      Comments: Patient  lying comfortable in bed on hr right side and daughter at bedside. Patient in no apparent pain.    Eyes:      Conjunctiva/sclera: Conjunctivae normal.   Cardiovascular:      Rate and Rhythm: Normal rate and regular rhythm.      Heart sounds: Normal heart sounds. No murmur heard.  Pulmonary:      Effort: Pulmonary effort is normal. No respiratory distress.      Breath sounds: Normal breath sounds. No wheezing.   Abdominal:      General: Abdomen is flat. Bowel sounds are normal. There is no distension.      Palpations: Abdomen is soft.      Tenderness: There is no abdominal tenderness.   Musculoskeletal:      Right lower leg: No edema.      Left lower leg: Edema (1+ edema to left lower leg and foot) present.   Skin:     General: Skin is warm.      Findings: No erythema.      Comments: Surgical dressing noted on hip. Dressing is clean and dry and intact.   Neurological:      Mental Status: She is disoriented.      Comments: Oriented to self only   Psychiatric:         Mood and Affect: Mood normal.         Behavior: Behavior normal. Behavior is cooperative.             Significant Labs: CBC:   Recent Labs   Lab 07/24/24  0601 07/25/24  0322   WBC 9.30 8.42   HGB 8.6* 8.3*   HCT 27.1* 26.6*    279     CMP:   Recent Labs   Lab 07/24/24  0601 07/25/24  0322    137   K 4.2 3.9    109   CO2 22* 24   GLU 93 89   BUN 38* 35*   CREATININE 1.3 1.1   CALCIUM 8.1* 8.3*   PROT 4.9* 4.8*   ALBUMIN 2.7* 2.5*   BILITOT 0.3 0.4   ALKPHOS 79 83   AST 12 11   ALT 7* <5*   ANIONGAP 6* 4*     Magnesium:   Recent Labs   Lab 07/25/24  0322   MG 2.0       Significant Imaging: I have reviewed all pertinent imaging results/findings within the past 24 hours.

## 2024-07-25 NOTE — ANESTHESIA POST-OP PAIN MANAGEMENT
Acute Pain Service Progress Note    Barby Burrell is a 89 y.o., female, [id number].    Surgery:  Left Distal Femur     Post Op Day #: 2    Catheter type: perineural  femoral    Infusion type: Ropivacaine 0.2%  7mL q3h IB with 5mL q30m DB     Problem List:    Active Hospital Problems    Diagnosis  POA    *Closed displaced supracondylar fracture of distal end of left femur with intracondylar extension with routine healing s/p ORIF on 7/23/2024 [S72.462D]  Not Applicable    Hospice care patient [Z51.5]  Not Applicable    Closed supracondylar fracture of left femur [S72.452A]  Yes    Primary open angle glaucoma (POAG) of both eyes, mild stage [H40.1131]  Yes    Paroxysmal atrial fibrillation [I48.0]  Yes    Dementia without behavioral disturbance, psychotic disturbance, mood disturbance, or anxiety [F03.90]  Yes    Acute kidney injury [N17.9]  Yes    Normocytic anemia [D64.9]  Yes    Primary hypertension [I10]  Yes      Resolved Hospital Problems   No resolved problems to display.       Subjective:     General appearance of alert, oriented, no complaints   Pain with rest: 2    Numbers   Pain with movement: 5    Numbers   Side Effects    1. Pruritis No    2. Nausea No    3. Motor Blockade No, 0=Ability to raise lower extremities off bed    4. Sedation No, 1=awake and alert    Objective:     Catheter site  Bandage lifting up. Reinforced with tegaderm.      Vitals   Vitals:    07/25/24 0822   BP: (!) 169/74   Pulse: 83   Resp: 20   Temp: 36.3 °C (97.4 °F)        Labs    No results displayed because visit has over 200 results.           Meds   Current Facility-Administered Medications   Medication Dose Route Frequency Provider Last Rate Last Admin    0.9%  NaCl infusion (for blood administration)   Intravenous Q24H PRN SELAM Woo MD        acetaminophen tablet 1,000 mg  1,000 mg Oral Q8H Carrington Hutchinson MD   1,000 mg at 07/24/24 2200    amLODIPine tablet 5 mg  5 mg Oral Daily Erwin Lazar MD   5 mg at 07/24/24  0912    apixaban tablet 2.5 mg  2.5 mg Oral BID aCrlos Romero MD   2.5 mg at 07/24/24 2100    aspirin chewable tablet 81 mg  81 mg Oral Daily Erwin Lazar MD   81 mg at 07/24/24 0912    dextrose 10% bolus 125 mL 125 mL  12.5 g Intravenous PRErwin Lock MD        dextrose 10% bolus 250 mL 250 mL  25 g Intravenous PRErwin Lock MD        glucagon (human recombinant) injection 1 mg  1 mg Intramuscular PRErwin Lock MD        glucose chewable tablet 16 g  16 g Oral PRErwin Lock MD        glucose chewable tablet 24 g  24 g Oral PRErwin Lock MD        latanoprost 0.005 % ophthalmic solution 1 drop  1 drop Both Eyes Q Charlene Greene MD   1 drop at 07/24/24 2100    melatonin tablet 6 mg  6 mg Oral Nightly Erwin Capps MD        memantine tablet 5 mg  5 mg Oral BID Erwin Lazar MD   5 mg at 07/24/24 2100    naloxone 0.4 mg/mL injection 0.02 mg  0.02 mg Intravenous PRErwin Lock MD        ondansetron injection 4 mg  4 mg Intravenous Q8H PRErwin Lock MD        prochlorperazine injection Soln 5 mg  5 mg Intravenous Q6H PRErwin Lock MD        QUEtiapine tablet 100 mg  100 mg Oral Nightly Erwin Lazar MD   100 mg at 07/24/24 2100    ropivacaine 0.2% Perineural Pump infusion 500 ML   Perineural Continuous Corrine Sloan MD   New Bag at 07/23/24 1007    senna-docusate 8.6-50 mg per tablet 1 tablet  1 tablet Oral Daily Erwin Lazar MD   1 tablet at 07/24/24 0912    sodium chloride 0.9% flush 10 mL  10 mL Intravenous PRErwin Lock MD        timolol maleate 0.25% ophthalmic solution 1 drop  1 drop Both Eyes QHS Erwin Lazar MD   1 drop at 07/24/24 2104        Anticoagulant dose Apixaban 2.5mg BID.    Assessment:  89 yo F prior hip sx in which she developed afib during the hospitalization, she was on eliquis for 2 years before it was discontinued. She presents for above following a fall.    Pain well controlled. Tylenol refused by  family this morning because patient sleeping comfortably.      Plan:    1) Continue Femoral PNC at current infusion rate: 7mL q3h IB with 5mL q30m DB   2) Continue Tylenol 1000mg q8h  3) No PRNs required at this time.    Case discussed with staff, Dr. Yost; final recommendations per attestation above.     Thank you for your consult and allowing us to participate in the care of this patient. We will continue to follow along. Please call the Acute Pain Service/Anesthesia if you have any questions or concerns.    Carrington Hutchinson MD  Department of Anesthesiology  [facility]  07/25/2024 9:09 AM

## 2024-07-25 NOTE — SUBJECTIVE & OBJECTIVE
"Principal Problem:Closed displaced supracondylar fracture of distal end of left femur with intracondylar extension with routine healing    Principal Orthopedic Problem: as above, s/p retrograde IMN 07/23    Interval History: Pt seen and examined at bedside. Hypertensive, otherwise VSS.  NAEON. Reports no new symptoms. Denies fevers or chill.       Review of patient's allergies indicates:  No Known Allergies    Current Facility-Administered Medications   Medication    0.9%  NaCl infusion (for blood administration)    acetaminophen tablet 1,000 mg    amLODIPine tablet 5 mg    apixaban tablet 2.5 mg    aspirin chewable tablet 81 mg    dextrose 10% bolus 125 mL 125 mL    dextrose 10% bolus 250 mL 250 mL    glucagon (human recombinant) injection 1 mg    glucose chewable tablet 16 g    glucose chewable tablet 24 g    latanoprost 0.005 % ophthalmic solution 1 drop    melatonin tablet 6 mg    memantine tablet 5 mg    naloxone 0.4 mg/mL injection 0.02 mg    ondansetron injection 4 mg    prochlorperazine injection Soln 5 mg    QUEtiapine tablet 100 mg    ropivacaine 0.2% Perineural Pump infusion 500 ML    senna-docusate 8.6-50 mg per tablet 1 tablet    sodium chloride 0.9% flush 10 mL    timolol maleate 0.25% ophthalmic solution 1 drop     Objective:     Vital Signs (Most Recent):  Temp: 97.4 °F (36.3 °C) (07/25/24 0822)  Pulse: 83 (07/25/24 0822)  Resp: 20 (07/25/24 0822)  BP: (!) 169/74 (07/25/24 0822)  SpO2: 95 % (07/25/24 0822) Vital Signs (24h Range):  Temp:  [97.4 °F (36.3 °C)-99 °F (37.2 °C)] 97.4 °F (36.3 °C)  Pulse:  [77-86] 83  Resp:  [18-20] 20  SpO2:  [93 %-97 %] 95 %  BP: (121-169)/(58-74) 169/74     Weight: 72.6 kg (160 lb 0.9 oz)  Height: 5' 4" (162.6 cm)  Body mass index is 27.47 kg/m².      Intake/Output Summary (Last 24 hours) at 7/25/2024 1148  Last data filed at 7/25/2024 1011  Gross per 24 hour   Intake 480 ml   Output 400 ml   Net 80 ml        Ortho/SPM Exam     AAOx1  NAD  Reg rate  No increased " WOB    LLE    Dressing C/D/I   Compartments soft/compressible  Ankle dorsiflexion & plantarflexion intact  Pt no responsive to sensation exam  Brisk cap refill  2+ pitting edema of the foot            Significant Labs: All pertinent labs within the past 24 hours have been reviewed.    Significant Imaging: I have reviewed and interpreted all pertinent imaging results/findings.

## 2024-07-25 NOTE — NURSING
Nurses Note -- 4 Eyes      7/25/2024   6:14 PM      Skin assessed during: Admit      [] No Altered Skin Integrity Present    []Prevention Measures Documented      [x] Yes- Altered Skin Integrity Present or Discovered   [] LDA Added if Not in Epic (Describe Wound)   [] New Altered Skin Integrity was Present on Admit and Documented in LDA   [] Wound Image Taken    Wound Care Consulted? Yes    Attending Nurse:  Sabrina Anand RN/Staff Member: Rosa Elena          Problem: Patient Care Overview  Goal: Plan of Care Review  Plan of care reviewed with patient and family.  Verbalized understanding.  Patient is alert and oriented time 4.  No acute neuro changes noticed throughout the night.  VSS.  Patient has PEG tube placed today. Confirmed med to be administrated within 6 hours.  Patient complained of abdominal pain.  Tylenol administered. Resting comfortaby in bed.  Will continue to monitor.

## 2024-07-25 NOTE — PROGRESS NOTES
[facility] - [facility]  Orthopedics  Progress Note    Patient Name: Barby Burrell  MRN: [id number]  Admission Date: 7/22/2024  Hospital Length of Stay: 3 days  Attending Provider: Pinky Adame MD  Primary Care Provider: Lori, Primary Doctor  Follow-up For: Procedure(s) (LRB):  ORIF, FRACTURE, DISTAL FEMUR (Left)    Post-Operative Day: 2 Days Post-Op  Subjective:     Principal Problem:Closed displaced supracondylar fracture of distal end of left femur with intracondylar extension with routine healing    Principal Orthopedic Problem: as above, s/p retrograde IMN 07/23    Interval History: Pt seen and examined at bedside. Hypertensive, otherwise VSS.  NAEON. Reports no new symptoms. Denies fevers or chill.       Review of patient's allergies indicates:  No Known Allergies    Current Facility-Administered Medications   Medication    0.9%  NaCl infusion (for blood administration)    acetaminophen tablet 1,000 mg    amLODIPine tablet 5 mg    apixaban tablet 2.5 mg    aspirin chewable tablet 81 mg    dextrose 10% bolus 125 mL 125 mL    dextrose 10% bolus 250 mL 250 mL    glucagon (human recombinant) injection 1 mg    glucose chewable tablet 16 g    glucose chewable tablet 24 g    latanoprost 0.005 % ophthalmic solution 1 drop    melatonin tablet 6 mg    memantine tablet 5 mg    naloxone 0.4 mg/mL injection 0.02 mg    ondansetron injection 4 mg    prochlorperazine injection Soln 5 mg    QUEtiapine tablet 100 mg    ropivacaine 0.2% Perineural Pump infusion 500 ML    senna-docusate 8.6-50 mg per tablet 1 tablet    sodium chloride 0.9% flush 10 mL    timolol maleate 0.25% ophthalmic solution 1 drop     Objective:     Vital Signs (Most Recent):  Temp: 97.4 °F (36.3 °C) (07/25/24 0822)  Pulse: 83 (07/25/24 0822)  Resp: 20 (07/25/24 0822)  BP: (!) 169/74 (07/25/24 0822)  SpO2: 95 % (07/25/24 0822) Vital Signs (24h Range):  Temp:  [97.4 °F (36.3 °C)-99 °F (37.2 °C)] 97.4 °F (36.3 °C)  Pulse:  [77-86] 83  Resp:  [18-20] 20  SpO2:  " [93 %-97 %] 95 %  BP: (121-169)/(58-74) 169/74     Weight: 72.6 kg (160 lb 0.9 oz)  Height: 5' 4" (162.6 cm)  Body mass index is 27.47 kg/m².      Intake/Output Summary (Last 24 hours) at 7/25/2024 1148  Last data filed at 7/25/2024 1011  Gross per 24 hour   Intake 480 ml   Output 400 ml   Net 80 ml        Ortho/SPM Exam     AAOx1  NAD  Reg rate  No increased WOB    LLE    Dressing C/D/I   Compartments soft/compressible  Ankle dorsiflexion & plantarflexion intact  Pt no responsive to sensation exam  Brisk cap refill  2+ pitting edema of the foot            Significant Labs: All pertinent labs within the past 24 hours have been reviewed.    Significant Imaging: I have reviewed and interpreted all pertinent imaging results/findings.  Assessment/Plan:     * Closed displaced supracondylar fracture of distal end of left femur with intracondylar extension with routine healing s/p ORIF on 7/23/2024  Barby Burrell is a 89 y.o. female with a PMHx of R hip fx 5 years ago s/p IMN and L hip fx 4 years ago s/p short IMN, HTN and dementia presenting with left distal femur fracture, closed, NVI. They take no anticoagulation at home. They were nonambulatory prior to this injury. She is s/p L femur retrograde IMN on 07/23    - DVT PPx: Eliquis  - Abx: post-op ancef completed  - PT/OT: evaluate & treat. WBAT LLE  - Multimodal pain management            Carlos Romero MD  Orthopedics  [facility] - Surgery    "

## 2024-07-25 NOTE — PROGRESS NOTES
[facility]  Progress Note    Patient Name: Barby Burrell  MRN: [id number]  Patient Class: IP- Inpatient   Admission Date: 7/22/2024  Length of Stay: 3 days  Attending Physician: Pinky Adame MD  Primary Care Provider: Lori, Primary Doctor        Subjective:     Principal Problem:Closed displaced supracondylar fracture of distal end of left femur with intracondylar extension with routine healing        HPI:  89 yo F with PMHx dementia and HLD who presents to the ED from care facility with L leg pain x a few days. Pt. Alert to person and occasionally polace, but she is unable to provide history. All history obtained per daughter at bedside. Per daughter, pt. Was last seen normal Thursday evening when her sister visited her. On Friday morning she received a call stating that the patient had been complaing of L leg pain and had a swollen knee. Pt. Currently on hospice, but given the apparent acute injury, an X-ray was obtained which revealed a distal femur fracture. Case was discussed with family who decided to send pt. To ED for orthopedic surgery evaluation and potential surgery.    Per family, at baseline pt. Is mostly wheelchair bound. She previously lived in [place] with 24/hr care givers until family decided recently to move her to a healthcare facility with hospice care closer to their home just a few weeks ago.    Overview/Hospital Course:  Patient was seen and evaluated by Orthopedic surgery who recommended operative repair of closed distal left femoral shaft fracture and closed left distal femur supracondylar fracture. Patient was medically optimized prior to surgery and was taken to OR after optimization on 7/23/2024. Patient underwent ORIF of left femoral shaft fracture with retrograde intramedullary nail and ORIF of left distal femur supracondylar fracture with intra-articular extension utilizing independent screw by Dr. Kam Navarro. Post-op patient weight bear as  tolerated to the left lower extremity as per Orthopedics recommendation. Patient placed on Apixiban 2.5 mg po BID and BHAKTI/SCD's for DVT prophylaxis post-op and will need for total of 30 days. Perineural pain catheter placed by Anesthesia Pain Service with continuous infusion of Ropivacaine to help with pain control post-op and Anesthesia Pain Service managing while patient in the hospital. Patient placed on multimodal pain management post-op with Tylenol 1000 mg po every 8 hours post-op and will continue as pain controlled with that regimen. PT/OT consulted post-op and evaluated patient and recommended low level intensity. Social work/case management discussed with daughters and thy would like patient to return home with home hospice s doing prior to this admit with DME. Patient to go to her daughter's house with home hospice on discharge and anticipate home discharge on 7/26. .     Interval History: One of patient's daughters at bedside. PT/OT consulted post-op and evaluated patient and recommended low level intensity. Social work/case management discussed with daughters and they would like patient to return home with home hospice as doing prior to this admit with DME. Patient to go to her daughterMariza's house with home hospice on discharge and anticipate home discharge on 7/26. Family in agreement with plan. So far pain is controlled to left leg and patient appeared comfortable on exam that her daughter stated was family's primary goal so for her mother to be comfortable. Daughter noted increased swelling to letf foot and lower leg. ACE bandage in place from left mid thigh to ankle but was noted to have edema to left foot noted and could be from ACE bandage and also could just be normal post-op swelling to surgical leg and will monitor for now. Labs reviewed. Hgb mildly decreased from 8.6 yesterday to 8.3 today. Patient with no clinical signs of bleeding. Creatinine stable at 1.1. BP well controlled.     Review  of Systems   Unable to perform ROS: Dementia     Objective:     Vital Signs (Most Recent):  Temp: 98.4 °F (36.9 °C) (07/25/24 1544)  Pulse: 87 (07/25/24 1544)  Resp: 18 (07/25/24 1544)  BP: 139/63 (07/25/24 1544)  SpO2: 95 % (07/25/24 1544) on room air Vital Signs (24h Range):  Temp:  [97.4 °F (36.3 °C)-99 °F (37.2 °C)] 98.4 °F (36.9 °C)  Pulse:  [77-87] 87  Resp:  [18-20] 18  SpO2:  [93 %-97 %] 95 %  BP: (121-169)/(58-74) 139/63     Weight: 72.6 kg (160 lb 0.9 oz)  Body mass index is 27.47 kg/m².    Intake/Output Summary (Last 24 hours) at 7/25/2024 1603  Last data filed at 7/25/2024 1332  Gross per 24 hour   Intake 480 ml   Output --   Net 480 ml         Physical Exam  Vitals and nursing note reviewed.   Constitutional:       General: She is awake. She is not in acute distress.     Appearance: Normal appearance. She is normal weight. She is not ill-appearing.      Comments: Patient lying comfortable in bed on hr right side and daughter at bedside. Patient in no apparent pain.    Eyes:      Conjunctiva/sclera: Conjunctivae normal.   Cardiovascular:      Rate and Rhythm: Normal rate and regular rhythm.      Heart sounds: Normal heart sounds. No murmur heard.  Pulmonary:      Effort: Pulmonary effort is normal. No respiratory distress.      Breath sounds: Normal breath sounds. No wheezing.   Abdominal:      General: Abdomen is flat. Bowel sounds are normal. There is no distension.      Palpations: Abdomen is soft.      Tenderness: There is no abdominal tenderness.   Musculoskeletal:      Right lower leg: No edema.      Left lower leg: Edema (1+ edema to left lower leg and foot) present.   Skin:     General: Skin is warm.      Findings: No erythema.      Comments: Surgical dressing noted on hip. Dressing is clean and dry and intact.   Neurological:      Mental Status: She is disoriented.      Comments: Oriented to self only   Psychiatric:         Mood and Affect: Mood normal.         Behavior: Behavior normal.  Behavior is cooperative.             Significant Labs: CBC:   Recent Labs   Lab 07/24/24  0601 07/25/24  0322   WBC 9.30 8.42   HGB 8.6* 8.3*   HCT 27.1* 26.6*    279     CMP:   Recent Labs   Lab 07/24/24  0601 07/25/24  0322    137   K 4.2 3.9    109   CO2 22* 24   GLU 93 89   BUN 38* 35*   CREATININE 1.3 1.1   CALCIUM 8.1* 8.3*   PROT 4.9* 4.8*   ALBUMIN 2.7* 2.5*   BILITOT 0.3 0.4   ALKPHOS 79 83   AST 12 11   ALT 7* <5*   ANIONGAP 6* 4*     Magnesium:   Recent Labs   Lab 07/25/24  0322   MG 2.0       Significant Imaging: I have reviewed all pertinent imaging results/findings within the past 24 hours.    Assessment/Plan:      * Closed displaced supracondylar fracture of distal end of left femur with intracondylar extension with routine healing s/p ORIF on 7/23/2024  Patient found on X-rays at home with home hospice and sent to ED that confirmed on X-ray and CT scan to have closed displaced eft femoral shaft fracture and closed nondisplaced left intra-articular distal femur supracondylar fracture with intracondylar extension. Patient at baseline wheelchair/bedbound but can still perform transfers to go to restroom. Patient was on hospice at home prior to admit for her advanced dementia.   - Orthopedic surgery consulted on admit and discussed surgical options with family and family decided on surgical intervention to help with pain management. Patient taken to OR on 7/23/2024 and underwent ORIF of left femoral shaft fracture with retrograde intramedullary nail and ORIF of left distal femur supracondylar fracture with intra-articular extension utilizing independent screw with DR. Kam Navarro.  - Post-op, patient is weight bear as tolerated and range of motion as tolerated to left lower extremity as per Ortho. PT/OT consulted with goal for transfers/prior level of function and pain control as family with realistic goals with plan for home hospice long term on discharge. Patient low  intensity therapy as per PT/OT and plan is for patient to go to her daughter's Mariza's house on discharge and resume home hospice care at daughter's house. DME ordered for martha lift, wheelchair and hospital bed for discharge via hospice agency. Family's main goal is for their mother to be comfortable and not in pain.   - Perineural cathter in place with Ropivacaine and Anesthesia is managing and appreciate assistance. Patient on scheduled Tylenol for pain and so far post-op pain is controlled.   - Surgical bandages to remain in place for 2 weeks until Orthopedic clinic follow-up.   - Continue Apixiban 2.5  mg po BID started post-op for DVT prophylaxis for 30 days post--op.   - Surgical site care as per Ortho.     Paroxysmal atrial fibrillation  Patient with Paroxysmal (<7 days) atrial fibrillation which is controlled currently with  no medications . Patient is currently in sinus rhythm.[id number] Score: 3. Anticoagulation not indicated due to had no return of atrial fibrillation since previous hip fracture surgery a few years ago when she had it happen post op and resolved and no issues sinec so stopped Apixiban a few years ago and has been on only Aspirin prophylaxis since then. Patient on home hospice so long term anticoagulation not recommended as against goals of care which is comfort. Family okay with doing short term Apixiban for DVT prophylaxis for 1 month and then stop.      Primary hypertension  Chronic, controlled. Latest blood pressure and vitals reviewed-     Temp:  [97.4 °F (36.3 °C)-99 °F (37.2 °C)]   Pulse:  [77-87]   Resp:  [18-20]   BP: (121-169)/(58-74)   SpO2:  [93 %-97 %] .   Home meds for hypertension were reviewed and noted below.   Hypertension Medications               amLODIPine (NORVASC) 5 MG tablet Take 5 mg by mouth once daily.            While in the hospital, will manage blood pressure as follows; Continue home antihypertensive regimen of Norvasc in hospital. Patient was recently taken  off Lisinopril by her hospice agency and no indication needs to be restarted.     Will utilize p.r.n. blood pressure medication only if patient's blood pressure greater than 180/110 and she develops symptoms such as worsening chest pain or shortness of breath.      Normocytic anemia  Patient's anemia is currently stable. Has received 1 units of PRBCs on 7/22/2024 pre-op for Hgb 8.4 . Hgb post-op stable since blood transfusion on 7/22. Etiology likely due to chronic disease due to chronic disease and hip fracture.  Current CBC reviewed-   Lab Results   Component Value Date    HGB 8.3 (L) 07/25/2024    HCT 26.6 (L) 07/25/2024     Monitor serial CBC and transfuse if patient becomes hemodynamically unstable, symptomatic or H/H drops below 7/21.      Acute kidney injury  Resolved. Patient on admit with Creatinine 1.7 consistent with GIANA. Present on admit. Patient given IVF's and Creatinine improved to 1.2 on 7/23 and stable at 1.1 on 7/25.       Severe late onset Alzheimer's dementia without behavioral disturbance, psychotic disturbance, mood disturbance, or anxiety  Patient with known advanced, severe Alzheimer's type dementia on home hospice at home for her dementia. Patient at cognitive baseline currently per family. Patient on Seroquel and Namenda at home to treat and will continue in hospital. Patient placed on delirium precautions in hospital. Please avoid any benzodiazepines as can worsen delirium and only should use in delirium caused by benzodiazepine or alcohol withdrawal. Please avoid all anticholinergic medications as can worsen delirium. Please keep curtains and blinds open during the day and closed during the night. Please continue to have nursing and family reorient patient and encourage family to visit and stay at night if possible. Please avoid physical restraints if possible as likely to worsen and increase agitation in delirious patient. No prn anti-psychotics indicated at this time.    Hospice care  patient  Patient on home hospice at home for her advanced dementia and DNR. Patient to continue to be DNR in hospital and plan for patient to discharge to her daughter's house on discharge with home hospice and DME. Family's goal is for patient to be comfortable and pain free.       Primary open angle glaucoma (POAG) of both eyes, mild stage  Chronic and controlled. Continue home Latanoprost and Timolol eye drops in hospital.       ACP (advance care planning)  Advance Care Planning    Date: 07/25/2024    [facility]  I engaged the family in a voluntary conversation about advance care planning and we specifically addressed what the goals of care would be moving forward and family's main goal is for patient to be comfortable. The family endorses that what is most important right now is to focus on spending time at home and symptom/pain control. Plan is for hoem dischareg with hospice care and DME. Patient is DNR as per family wishes.     A total of 10 min was spent on advance care planning, goals of care discussion, emotional support, formulating and communicating prognosis and exploring burden/benefit of various approaches of treatment. This discussion occurred on a fully voluntary basis with the verbal consent of family.              VTE Risk Mitigation (From admission, onward)           Ordered     apixaban tablet 2.5 mg  2 times daily         07/23/24 1016     Place sequential compression device  Until discontinued         07/23/24 0538     Place sequential compression device  Until discontinued         07/22/24 1950                    Discharge Planning   KONSTANTIN: 7/26/2024     Code Status: DNR   Is the patient medically ready for discharge?:     Reason for patient still in hospital (select all that apply): Patient trending condition  Discharge Plan A: Hospice/home   Discharge Delays: None known at this time (patient's family does not want patient to discharge back to [facility])      Pinky Adame MD  Department  of [facility]   [facility] - Surgery

## 2024-07-25 NOTE — ASSESSMENT & PLAN NOTE
Patient on home hospice at home for her advanced dementia and DNR. Patient to continue to be DNR in hospital and plan for patient to discharge to her daughter's house on discharge with home hospice and DME. Family's goal is for patient to be comfortable and pain free.

## 2024-07-25 NOTE — ASSESSMENT & PLAN NOTE
Patient's anemia is currently stable. Has received 1 units of PRBCs on 7/22/2024 pre-op for Hgb 8.4 . Hgb post-op stable since blood transfusion on 7/22. Etiology likely due to chronic disease due to chronic disease and hip fracture.  Current CBC reviewed-   Lab Results   Component Value Date    HGB 8.3 (L) 07/25/2024    HCT 26.6 (L) 07/25/2024     Monitor serial CBC and transfuse if patient becomes hemodynamically unstable, symptomatic or H/H drops below 7/21.

## 2024-07-25 NOTE — ASSESSMENT & PLAN NOTE
Barby Burrell is a 89 y.o. female with a PMHx of R hip fx 5 years ago s/p IMN and L hip fx 4 years ago s/p short IMN, HTN and dementia presenting with left distal femur fracture, closed, NVI. They take no anticoagulation at home. They were nonambulatory prior to this injury. She is s/p L femur retrograde IMN on 07/23    - DVT PPx: Eliquis  - Abx: post-op ancef completed  - PT/OT: evaluate & treat. WBAT LLE  - Multimodal pain management

## 2024-07-25 NOTE — PLAN OF CARE
07/25/24 0734   Post-Acute Status   Post-Acute Authorization Hospice   Post-Acute Placement Status Pending medical clearance/testing   Discharge Plan   Discharge Plan A Hospice/home     Patient to resume Home Hospice services w/ [facility] e/ Daughter Mariza, when medically stable.    JOSEPH called [facility] to confirm clinicals and request for DME received (Hospital bed, Wheelchair, and martha lift), staff confirmed. JOSEPH sent updated PT/OT notes via hard fax.    ([facility]) Phone # [phone], Fax # [phone].     12:01 PM  SW spoke to pt's daughter Mariza, obtain address to arrange pt's transport for day of d/c ([address] [place] [address]). DME has been delivered to pt's daughter home, awaiting medical stability.    Paola Cruz, KASH  Case Management   [facility]   Ext. [phone]

## 2024-07-25 NOTE — ASSESSMENT & PLAN NOTE
Advance Care Planning     Date: 07/25/2024    [facility]  I engaged the family in a voluntary conversation about advance care planning and we specifically addressed what the goals of care would be moving forward and family's main goal is for patient to be comfortable. The family endorses that what is most important right now is to focus on spending time at home and symptom/pain control. Plan is for hoem dischareg with hospice care and DME. Patient is DNR as per family wishes.     A total of 10 min was spent on advance care planning, goals of care discussion, emotional support, formulating and communicating prognosis and exploring burden/benefit of various approaches of treatment. This discussion occurred on a fully voluntary basis with the verbal consent of family.

## 2024-07-25 NOTE — ASSESSMENT & PLAN NOTE
Patient with Paroxysmal (<7 days) atrial fibrillation which is controlled currently with  no medications . Patient is currently in sinus rhythm.[id number] Score: 3. Anticoagulation not indicated due to had no return of atrial fibrillation since previous hip fracture surgery a few years ago when she had it happen post op and resolved and no issues sinec so stopped Apixiban a few years ago and has been on only Aspirin prophylaxis since then. Patient on home hospice so long term anticoagulation not recommended as against goals of care which is comfort. Family okay with doing short term Apixiban for DVT prophylaxis for 1 month and then stop.

## 2024-07-25 NOTE — PT/OT/SLP PROGRESS
Occupational Therapy   Treatment    Name: Barby Burrell  MRN: [id number]  Admitting Diagnosis:  Closed displaced supracondylar fracture of distal end of left femur with intracondylar extension with routine healing  2 Days Post-Op    Recommendations:     Discharge Recommendations: Low Intensity Therapy  Discharge Equipment Recommendations:  none  Barriers to discharge:  None    Assessment:     Barby Burrell is a 89 y.o. female with a medical diagnosis of Closed displaced supracondylar fracture of distal end of left femur with intracondylar extension with routine healing.  She presents with being soiled. Performance deficits affecting function are weakness, impaired endurance, impaired self care skills, impaired functional mobility, impaired cognition, decreased lower extremity function, orthopedic precautions, gait instability, impaired balance. Pt plans to return to [facility] house and continue with home hospice care; pt requires max A to total A for ADLs.    Rehab Prognosis:  Fair; patient would benefit from acute skilled OT services to address these deficits and reach maximum level of function.       Plan:     Patient to be seen 3 x/week to address the above listed problems via self-care/home management, therapeutic activities, therapeutic exercises, neuromuscular re-education  Plan of Care Expires: 08/23/24  Plan of Care Reviewed with: patient, daughter    Subjective     Chief Complaint: Soiled  Patient/Family Comments/goals: return home  Pain/Comfort:  Location - Side 1: Left  Location - Orientation 1: generalized  Location 1: leg  Pain Addressed 1: Reposition, Cessation of Activity    Objective:     Communicated with: Jaz prior to session.  Patient found supine with perineural catheter, ireland catheter upon OT entry to room. PCT present in room prepping to clean Pt and change linen    General Precautions: Standard, aspiration, fall    Orthopedic Precautions:LLE weight bearing as tolerated  Braces: N/A  Respiratory  Status: Room air     Occupational Performance:     Bed Mobility:    Patient completed Rolling/Turning to Left with  maximal assistance and 2 persons  Patient completed Rolling/Turning to Right with maximal assistance and 2 persons     Functional Mobility/Transfers:  Functional Mobility: NT 2/2 to pt safety and fatigue    Activities of Daily Living:  Upper Body Dressing: maximal assistance Change gown  Toileting: total assistance change pure wick, erica care, clothing mgmt      AMPAC 6 Click ADL: 10    Treatment & Education:  Pt educated on role and purpose of therapy  Pt educated on goal setting  Pt educated on benefits of OOB activity  Pt educated on self advocacy   Pt dtr educated on AD recommendations    Patient left supine with all lines intact, call button in reach, nsg notified, and PCT and dtr present    GOALS:   Multidisciplinary Problems       Occupational Therapy Goals          Problem: Occupational Therapy    Goal Priority Disciplines Outcome Interventions   Occupational Therapy Goal     OT, PT/OT Progressing    Description: Goals to be met by: 8/23/24     Patient will increase functional independence with ADLs by performing:    Grooming while seated with Set-up Assistance and Stand-by Assistance.  Toileting from bedside commode with Moderate Assistance for hygiene and clothing management.   All functional transfers performed with Mod A                         Time Tracking:     OT Date of Treatment: 07/25/24  OT Start Time: 0925  OT Stop Time: 0948  OT Total Time (min): 23 min    Billable Minutes:Self Care/Home Management 23    OT/CARMEN: OT          7/25/2024

## 2024-07-25 NOTE — ASSESSMENT & PLAN NOTE
Patient found on X-rays at home with home hospice and sent to ED that confirmed on X-ray and CT scan to have closed displaced eft femoral shaft fracture and closed nondisplaced left intra-articular distal femur supracondylar fracture with intracondylar extension. Patient at baseline wheelchair/bedbound but can still perform transfers to go to restroom. Patient was on hospice at home prior to admit for her advanced dementia.   - Orthopedic surgery consulted on admit and discussed surgical options with family and family decided on surgical intervention to help with pain management. Patient taken to OR on 7/23/2024 and underwent ORIF of left femoral shaft fracture with retrograde intramedullary nail and ORIF of left distal femur supracondylar fracture with intra-articular extension utilizing independent screw with DR. Kam Navarro.  - Post-op, patient is weight bear as tolerated and range of motion as tolerated to left lower extremity as per Ortho. PT/OT consulted with goal for transfers/prior level of function and pain control as family with realistic goals with plan for home hospice long term on discharge. Patient low intensity therapy as per PT/OT and plan is for patient to go to her daughter's Mariza's house on discharge and resume home hospice care at daughter's house. DME ordered for martha lift, wheelchair and hospital bed for discharge via hospice agency. Family's main goal is for their mother to be comfortable and not in pain.   - Perineural cathter in place with Ropivacaine and Anesthesia is managing and appreciate assistance. Patient on scheduled Tylenol for pain and so far post-op pain is controlled.   - Surgical bandages to remain in place for 2 weeks until Orthopedic clinic follow-up.   - Continue Apixiban 2.5  mg po BID started post-op for DVT prophylaxis for 30 days post--op.   - Surgical site care as per Ortho.

## 2024-07-26 VITALS
DIASTOLIC BLOOD PRESSURE: 72 MMHG | TEMPERATURE: 99 F | OXYGEN SATURATION: 92 % | HEIGHT: 64 IN | SYSTOLIC BLOOD PRESSURE: 143 MMHG | BODY MASS INDEX: 27.33 KG/M2 | HEART RATE: 94 BPM | RESPIRATION RATE: 18 BRPM | WEIGHT: 160.06 LBS

## 2024-07-26 PROBLEM — N17.9 ACUTE KIDNEY INJURY: Status: RESOLVED | Noted: 2024-07-22 | Resolved: 2024-07-26

## 2024-07-26 LAB
ALBUMIN SERPL BCP-MCNC: 2.4 G/DL (ref 3.5–5.2)
ALP SERPL-CCNC: 100 U/L (ref 55–135)
ALT SERPL W/O P-5'-P-CCNC: <5 U/L (ref 10–44)
ANION GAP SERPL CALC-SCNC: 8 MMOL/L (ref 8–16)
AST SERPL-CCNC: 12 U/L (ref 10–40)
BASOPHILS # BLD AUTO: 0.05 K/UL (ref 0–0.2)
BASOPHILS NFR BLD: 0.5 % (ref 0–1.9)
BILIRUB SERPL-MCNC: 0.5 MG/DL (ref 0.1–1)
BLD PROD TYP BPU: NORMAL
BLD PROD TYP BPU: NORMAL
BLOOD UNIT EXPIRATION DATE: NORMAL
BLOOD UNIT EXPIRATION DATE: NORMAL
BLOOD UNIT TYPE CODE: 600
BLOOD UNIT TYPE CODE: 600
BLOOD UNIT TYPE: NORMAL
BLOOD UNIT TYPE: NORMAL
BUN SERPL-MCNC: 30 MG/DL (ref 10–30)
CALCIUM SERPL-MCNC: 8.3 MG/DL (ref 8.7–10.5)
CHLORIDE SERPL-SCNC: 108 MMOL/L (ref 95–110)
CO2 SERPL-SCNC: 20 MMOL/L (ref 23–29)
CODING SYSTEM: NORMAL
CODING SYSTEM: NORMAL
CREAT SERPL-MCNC: 0.9 MG/DL (ref 0.5–1.4)
CROSSMATCH INTERPRETATION: NORMAL
CROSSMATCH INTERPRETATION: NORMAL
DIFFERENTIAL METHOD BLD: ABNORMAL
DISPENSE STATUS: NORMAL
DISPENSE STATUS: NORMAL
EOSINOPHIL # BLD AUTO: 0.4 K/UL (ref 0–0.5)
EOSINOPHIL NFR BLD: 4.1 % (ref 0–8)
ERYTHROCYTE [DISTWIDTH] IN BLOOD BY AUTOMATED COUNT: 15.6 % (ref 11.5–14.5)
EST. GFR  (NO RACE VARIABLE): 58.1 ML/MIN/1.73 M^2
GLUCOSE SERPL-MCNC: 90 MG/DL (ref 70–110)
HCT VFR BLD AUTO: 26.8 % (ref 37–48.5)
HGB BLD-MCNC: 8.1 G/DL (ref 12–16)
IMM GRANULOCYTES # BLD AUTO: 0.47 K/UL (ref 0–0.04)
IMM GRANULOCYTES NFR BLD AUTO: 5 % (ref 0–0.5)
LYMPHOCYTES # BLD AUTO: 1.2 K/UL (ref 1–4.8)
LYMPHOCYTES NFR BLD: 12.2 % (ref 18–48)
MCH RBC QN AUTO: 29.6 PG (ref 27–31)
MCHC RBC AUTO-ENTMCNC: 30.2 G/DL (ref 32–36)
MCV RBC AUTO: 98 FL (ref 82–98)
MONOCYTES # BLD AUTO: 0.7 K/UL (ref 0.3–1)
MONOCYTES NFR BLD: 6.9 % (ref 4–15)
NEUTROPHILS # BLD AUTO: 6.7 K/UL (ref 1.8–7.7)
NEUTROPHILS NFR BLD: 71.3 % (ref 38–73)
NRBC BLD-RTO: 0 /100 WBC
PLATELET # BLD AUTO: 293 K/UL (ref 150–450)
PMV BLD AUTO: 10.6 FL (ref 9.2–12.9)
POTASSIUM SERPL-SCNC: 3.9 MMOL/L (ref 3.5–5.1)
PROT SERPL-MCNC: 4.7 G/DL (ref 6–8.4)
RBC # BLD AUTO: 2.74 M/UL (ref 4–5.4)
SODIUM SERPL-SCNC: 136 MMOL/L (ref 136–145)
TRANS ERYTHROCYTES VOL PATIENT: NORMAL ML
TRANS ERYTHROCYTES VOL PATIENT: NORMAL ML
WBC # BLD AUTO: 9.4 K/UL (ref 3.9–12.7)

## 2024-07-26 PROCEDURE — 25000003 PHARM REV CODE 250: Performed by: INTERNAL MEDICINE

## 2024-07-26 PROCEDURE — 85027 COMPLETE CBC AUTOMATED: CPT | Performed by: HOSPITALIST

## 2024-07-26 PROCEDURE — 25000003 PHARM REV CODE 250

## 2024-07-26 PROCEDURE — 97530 THERAPEUTIC ACTIVITIES: CPT

## 2024-07-26 PROCEDURE — 25000003 PHARM REV CODE 250: Performed by: HOSPITALIST

## 2024-07-26 PROCEDURE — 36415 COLL VENOUS BLD VENIPUNCTURE: CPT | Performed by: HOSPITALIST

## 2024-07-26 PROCEDURE — 85007 BL SMEAR W/DIFF WBC COUNT: CPT | Performed by: HOSPITALIST

## 2024-07-26 PROCEDURE — 97535 SELF CARE MNGMENT TRAINING: CPT | Mod: CO

## 2024-07-26 PROCEDURE — 97110 THERAPEUTIC EXERCISES: CPT

## 2024-07-26 PROCEDURE — 97112 NEUROMUSCULAR REEDUCATION: CPT | Mod: CO

## 2024-07-26 PROCEDURE — 80053 COMPREHEN METABOLIC PANEL: CPT | Performed by: HOSPITALIST

## 2024-07-26 RX ORDER — BISACODYL 10 MG/1
10 SUPPOSITORY RECTAL DAILY PRN
Status: DISCONTINUED | OUTPATIENT
Start: 2024-07-26 | End: 2024-07-26 | Stop reason: HOSPADM

## 2024-07-26 RX ORDER — AMOXICILLIN 250 MG
1 CAPSULE ORAL DAILY
COMMUNITY
Start: 2024-07-26

## 2024-07-26 RX ORDER — OXYCODONE HYDROCHLORIDE 5 MG/1
5 TABLET ORAL EVERY 4 HOURS PRN
Qty: 30 TABLET | Refills: 0 | Status: SHIPPED | OUTPATIENT
Start: 2024-07-26

## 2024-07-26 RX ORDER — ACETAMINOPHEN 500 MG
1000 TABLET ORAL EVERY 8 HOURS
COMMUNITY
Start: 2024-07-26

## 2024-07-26 RX ADMIN — ASPIRIN 81 MG CHEWABLE TABLET 81 MG: 81 TABLET CHEWABLE at 09:07

## 2024-07-26 RX ADMIN — ACETAMINOPHEN 1000 MG: 500 TABLET ORAL at 01:07

## 2024-07-26 RX ADMIN — BISACODYL 10 MG: 10 SUPPOSITORY RECTAL at 01:07

## 2024-07-26 RX ADMIN — SENNOSIDES AND DOCUSATE SODIUM 1 TABLET: 50; 8.6 TABLET ORAL at 09:07

## 2024-07-26 RX ADMIN — APIXABAN 2.5 MG: 2.5 TABLET, FILM COATED ORAL at 09:07

## 2024-07-26 RX ADMIN — AMLODIPINE BESYLATE 5 MG: 5 TABLET ORAL at 09:07

## 2024-07-26 RX ADMIN — MEMANTINE 5 MG: 5 TABLET ORAL at 09:07

## 2024-07-26 NOTE — ASSESSMENT & PLAN NOTE
Stable on discharge. Patient's anemia is currently stable. Has received 1 units of PRBCs on 7/22/2024 pre-op for Hgb 8.4 . Hgb post-op stable since blood transfusion on 7/22. Etiology likely due to chronic disease due to chronic disease and hip fracture.  Current CBC reviewed-   Lab Results   Component Value Date    HGB 8.1 (L) 07/26/2024    HCT 26.8 (L) 07/26/2024

## 2024-07-26 NOTE — ANESTHESIA POST-OP PAIN MANAGEMENT
Acute Pain Service Progress Note    Barby Burrell is a 89 y.o., female, [id number].    Surgery:  Left Distal Femur     Post Op Day #: 3    Catheter type: perineural  femoral    Infusion type: Ropivacaine 0.2%  7mL q3h IB with 5mL q30m DB     Problem List:    Active Hospital Problems    Diagnosis  POA    *Closed displaced supracondylar fracture of distal end of left femur with intracondylar extension with routine healing s/p ORIF on 7/23/2024 [S72.462D]  Not Applicable    Hospice care patient [Z51.5]  Not Applicable    Closed supracondylar fracture of left femur [S72.452A]  Yes    Primary open angle glaucoma (POAG) of both eyes, mild stage [H40.1131]  Yes    Paroxysmal atrial fibrillation [I48.0]  Yes    Severe late onset Alzheimer's dementia without behavioral disturbance, psychotic disturbance, mood disturbance, or anxiety [G30.1, F02.C0]  Yes    Acute kidney injury [N17.9]  Yes    Normocytic anemia [D64.9]  Yes    Primary hypertension [I10]  Yes    ACP (advance care planning) [Z71.89]  Not Applicable      Resolved Hospital Problems   No resolved problems to display.       Subjective:     General appearance of alert, oriented, no complaints   Pain with rest: 2    Numbers   Pain with movement: 5    Numbers   Side Effects    1. Pruritis No    2. Nausea No    3. Motor Blockade No, 0=Ability to raise lower extremities off bed    4. Sedation No, 1=awake and alert    Objective:     Catheter site clean, dry, intact      Vitals   Vitals:    07/26/24 0746   BP: (!) 152/72   Pulse: 88   Resp: 18   Temp: 36.7 °C (98 °F)        Labs    No results displayed because visit has over 200 results.           Meds   Current Facility-Administered Medications   Medication Dose Route Frequency Provider Last Rate Last Admin    0.9%  NaCl infusion (for blood administration)   Intravenous Q24H PRN SELAM Woo MD        acetaminophen tablet 1,000 mg  1,000 mg Oral Q8H Carrington Hutchinson MD   1,000 mg at 07/25/24 2025    amLODIPine  tablet 5 mg  5 mg Oral Daily Erwin Lazar MD   5 mg at 07/26/24 0944    apixaban tablet 2.5 mg  2.5 mg Oral BID Carlos Romero MD   2.5 mg at 07/26/24 0945    aspirin chewable tablet 81 mg  81 mg Oral Daily Erwin Lazar MD   81 mg at 07/26/24 0944    dextrose 10% bolus 125 mL 125 mL  12.5 g Intravenous PRErwin oLck MD        dextrose 10% bolus 250 mL 250 mL  25 g Intravenous PRErwin Lock MD        glucagon (human recombinant) injection 1 mg  1 mg Intramuscular PRN Erwin Lazar MD        glucose chewable tablet 16 g  16 g Oral PRErwin Lock MD        glucose chewable tablet 24 g  24 g Oral PRErwin Lock MD        latanoprost 0.005 % ophthalmic solution 1 drop  1 drop Both Eyes Q Charlene Greene MD   1 drop at 07/25/24 2100    melatonin tablet 6 mg  6 mg Oral Nightly PRErwin Lock MD        memantine tablet 5 mg  5 mg Oral BID Erwin Lazar MD   5 mg at 07/26/24 0944    naloxone 0.4 mg/mL injection 0.02 mg  0.02 mg Intravenous PRErwin Lock MD        ondansetron injection 4 mg  4 mg Intravenous Q8H PRErwin Lock MD        prochlorperazine injection Soln 5 mg  5 mg Intravenous Q6H PRN Erwin Lazar MD        QUEtiapine tablet 100 mg  100 mg Oral Nightly Erwin Lazar MD   100 mg at 07/25/24 2025    senna-docusate 8.6-50 mg per tablet 1 tablet  1 tablet Oral Daily Erwin Lazar MD   1 tablet at 07/26/24 0944    sodium chloride 0.9% flush 10 mL  10 mL Intravenous PRN Erwin Lazar MD        timolol maleate 0.25% ophthalmic solution 1 drop  1 drop Both Eyes QHS Erwin Lazar MD   1 drop at 07/25/24 2100        Anticoagulant dose Apixaban 2.5mg BID.    Assessment:  89 yo F prior hip sx in which she developed afib during the hospitalization, she was on eliquis for 2 years before it was discontinued. She presents for above following a fall.    Pain well controlled.      Plan:    1) Pause Femoral PNC  2) Continue Tylenol 1000mg q8h  3)  No PRNs required at this time.  4) Plan to pull PNC assuming patient is pain is tolerable    Case discussed with staff, Dr. Andersen; final recommendations per attestation above.    Thank you for the consult and allowing us to participate in the care of this patient. We will sign off now. Please call Acute Pain Service/Anesthesia if you have any further questions or concerns.    Carrington Hutchinson MD  Department of Anesthesiology  [facility]  07/26/2024 10:09 AM

## 2024-07-26 NOTE — ADDENDUM NOTE
Addendum  created 07/26/24 1103 by Milla Andersen MD    Charge Capture section accepted, Cosign clinical note with attestation

## 2024-07-26 NOTE — PT/OT/SLP PROGRESS
Physical Therapy Co-Treatment    OT present for cotreat due to pt's multiple medical comorbidities and functional/cognition deficits requiring two skilled therapists to appropriately progress pt's musculoskeletal strength, neuromuscular control, and endurance while taking into consideration medical acuity and pt safety.    Patient Name:  Barby Burrell   MRN:  [id number]    Recommendations:     Discharge Recommendations: Low Intensity Therapy  Discharge Equipment Recommendations: wheelchair, lift device, hospital bed, bedside commode  Barriers to discharge: None    Assessment:     Barby Burrell is a 89 y.o. female admitted with a medical diagnosis of Closed displaced supracondylar fracture of distal end of left femur with intracondylar extension with routine healing.  She presents with the following impairments/functional limitations: weakness, impaired endurance, impaired functional mobility, impaired self care skills, impaired balance, gait instability, decreased lower extremity function, impaired cognition, orthopedic precautions     Pt receptive and tolerated PT co-treatment with OT well. Pt still requires max A of 2 persons to complete bed mobility this session. Pt able to sit EOB with CGA to min A this session while performing BLE exercises and self care activities.  Patient continues to demonstrate the need for low intensity therapy on a scheduled basis exhibited by decreased independence with self-care and functional mobility    Rehab Prognosis: Good; patient would benefit from acute skilled PT services to address these deficits and reach maximum level of function.    Recent Surgery: Procedure(s) (LRB):  ORIF, FRACTURE, DISTAL FEMUR (Left) 3 Days Post-Op    Plan:     During this hospitalization, patient to be seen 3 x/week to address the identified rehab impairments via gait training, therapeutic activities, therapeutic exercises, neuromuscular re-education and progress toward the following goals:    Plan  of Care Expires:  08/23/24    Subjective     Chief Complaint: none reported  Patient/Family Comments/goals: to go home  Pain/Comfort:  Pain Rating 1: 0/10  Location - Side 1: Left  Location 1: leg  Pain Rating Post-Intervention 1: 0/10      Objective:     Communicated with RN prior to session.  Patient found HOB elevated with perineural catheter, PureWick upon PT entry to room.     General Precautions: Standard, fall  Orthopedic Precautions: LLE weight bearing as tolerated  Braces: N/A  Respiratory Status: Room air     Functional Mobility:    Bed Mobility:   Rolling: to R and L with maximal assistance  Supine > Sit: maximal assistance and of 2 persons  Sit > Supine: maximal assistance and of 2 persons    Transfers:   Sit <> Stand Transfer: total assistance and of 2 persons from EOB using HHA x2 Trials  PT and OT blocking knees during both trials  Pt unable to achieved full upright on either attempt    Balance:   Sitting balance: FAIR: Cannot move trunk without losing balance  Pt sat EOB ~12 mins  Pt needed CGA to min A to maintain balance sitting  Pt able to perform BLE exercises sitting EOB      AM-PAC 6 CLICK MOBILITY  Turning over in bed (including adjusting bedclothes, sheets and blankets)?: 2  Sitting down on and standing up from a chair with arms (e.g., wheelchair, bedside commode, etc.): 2  Moving from lying on back to sitting on the side of the bed?: 2  Moving to and from a bed to a chair (including a wheelchair)?: 1  Need to walk in hospital room?: 1  Climbing 3-5 steps with a railing?: 1  Basic Mobility Total Score: 9       Treatment & Education:  Pt preformed BLE exercises sitting EOB including LAQs and ankle pumps for 2 sets of 10 reps each.  Pt educated on tip to reduce fall risk and safety with mobility and using call button for assistance from nursing staff with bed mobility.  All questions answered within the scope of PT.  White board updated accordingly.      Patient left HOB elevated with all lines  intact, call button in reach, and daughter present..    GOALS:   Multidisciplinary Problems       Physical Therapy Goals          Problem: Physical Therapy    Goal Priority Disciplines Outcome Goal Variances Interventions   Physical Therapy Goal     PT, PT/OT Progressing     Description: Goals to be met by: 24     Patient will increase functional independence with mobility by performin. Supine to sit with Moderate Assistance  2. Sit to supine with Moderate Assistance  3. Rolling to Left and Right with Minimal Assistance.  4. Sit to stand transfer with Moderate Assistance  5. Bed to chair transfer with Moderate Assistance using LRAD  6. Sitting at edge of bed x8 minutes with Minimal Assistance    DME Justification  Barby requires a commode for home use because she is confined to a single room.  Barby requires a hospital bed due to her requiring positioning of the body in ways not feasible with an ordinary bed due to limited ability and cannot independently make changes in body position without the use of the bed.    Patient requires a hospital bed for positioning of the body in ways that are not feasible with an ordinary bed. The patient requires special positioning for pain relief, limited mobility, and/or being unable to independently make changes in body position without the use of a hospital bed. Pillows and wedges will not be adequate for resolving these positional issues.        Barby Burrell has a mobility limitation that significantly impairs her ability to participate in one or more mobility related activities in the community. The mobility limitation cannot be sufficiently resolved by the use of a cane or walker. The use of a transport wheelchair will significantly improve the patient's ability to participate in the community and the patient will use it on regular basis outside the home. Barby has expressed her willingness to use a transport wheelchair outside the home. she also has  a caregiver who is available, willing, and able to provide assistance with the wheelchair when needed.                          Time Tracking:     PT Received On: 07/26/24  PT Start Time: 0904     PT Stop Time: 0927  PT Total Time (min): 23 min     Billable Minutes: Therapeutic Activity 10 and Therapeutic Exercise 13    Treatment Type: Treatment  PT/PTA: PT           07/26/2024

## 2024-07-26 NOTE — ASSESSMENT & PLAN NOTE
Barby Burrell is a 89 y.o. female with a PMHx of R hip fx 5 years ago s/p IMN and L hip fx 4 years ago s/p short IMN, HTN and dementia presenting with left distal femur fracture, closed, NVI. They take no anticoagulation at home. They were nonambulatory prior to this injury. She is s/p L femur retrograde IMN on 07/23. Ok for staples to be removed by doctor or nursing at the palliative center at least 2 weeks after surgery with evidence of appropriate wound healing. We will f/u with patient in 2 weeks    - DVT PPx: Eliquis  - Abx: post-op ancef completed  - PT/OT: evaluate & treat. WBAT LLE  - Multimodal pain management    Dispo: pt is stable for dc from ortho's perspective

## 2024-07-26 NOTE — PT/OT/SLP PROGRESS
Occupational Therapy   Co-Treatment with PT  Co-treatment performed due to patient's multiple deficits requiring two skilled therapists to appropriately and safely assess patient's strength and endurance while facilitating functional tasks in addition to accommodating for patient's activity tolerance.     Name: Barby Burrell  MRN: [id number]  Admitting Diagnosis:  Closed displaced supracondylar fracture of distal end of left femur with intracondylar extension with routine healing  3 Days Post-Op    Recommendations:     Discharge Recommendations: Low Intensity Therapy  Discharge Equipment Recommendations:  none  Barriers to discharge:       Assessment:     Barby Burrell is a 89 y.o. female with a medical diagnosis of Closed displaced supracondylar fracture of distal end of left femur with intracondylar extension with routine healing.  She presents with the following performance deficits affecting function: weakness, impaired endurance, gait instability, impaired balance, impaired self care skills, impaired functional mobility, decreased coordination, decreased safety awareness, decreased lower extremity function, decreased upper extremity function, impaired cardiopulmonary response to activity.     Rehab Prognosis:  Good; patient would benefit from acute skilled OT services to address these deficits and reach maximum level of function.       Plan:     Patient to be seen 3 x/week to address the above listed problems via self-care/home management, therapeutic activities, therapeutic exercises, neuromuscular re-education  Plan of Care Expires: 08/23/24  Plan of Care Reviewed with: patient, daughter    Subjective     Patient/Family Comments/goals: To improve  function  Pain/Comfort:  Pain Rating 1: 0/10  Pain Rating Post-Intervention 1: 0/10    Objective:     Communicated with: RN prior to session.  Patient found HOB elevated with perineural catheter, PureWick upon OT entry to room.  A client care conference was  completed by the OTR and the KONG prior to treatment by the KONG to discuss the patient's POC and current status.    General Precautions: Standard, aspiration, fall    Orthopedic Precautions:LLE weight bearing as tolerated  Braces: N/A  Respiratory Status: Room air     Occupational Performance:     Bed Mobility:    Patient completed Rolling/Turning to Left with  maximal assistance  Patient completed Rolling/Turning to Right with maximal assistance  Patient completed Scooting/Bridging with maximal assistance  Patient completed Supine to Sit with maximal assistance and 2 persons  Patient completed Sit to Supine with maximal assistance and 2 persons     Functional Mobility/Transfers:  Patient completed Sit <> Stand Transfer with total assistance and of 2 persons  with  no assistive device     Activities of Daily Living:  Grooming: stand by assistance for facial care  Toileting: total assistance for pericare and clothing mgmt with rolling      AMPAC 6 Click ADL: 11    Treatment & Education:  Pt educated on OT POC and frequency during hospital stay.   Pt educated on importance of OOB activity to improve function and activity tolerance.  Addressed all patient questions/concerns within KONG scope of practice.     Patient left HOB elevated with all lines intact, call button in reach, and RN notified    GOALS:   Multidisciplinary Problems       Occupational Therapy Goals          Problem: Occupational Therapy    Goal Priority Disciplines Outcome Interventions   Occupational Therapy Goal     OT, PT/OT Progressing    Description: Goals to be met by: 8/23/24     Patient will increase functional independence with ADLs by performing:    Grooming while seated with Set-up Assistance and Stand-by Assistance.  Toileting from bedside commode with Moderate Assistance for hygiene and clothing management.   All functional transfers performed with Mod A                         Time Tracking:     OT Date of Treatment: 07/26/24  OT Start  Time: 0904  OT Stop Time: 0927  OT Total Time (min): 23 min    Billable Minutes:Self Care/Home Management 10  Therapeutic Activity 13    OT/CARMEN: CARMEN     Number of CARMEN visits since last OT visit: 1 7/26/2024

## 2024-07-26 NOTE — PLAN OF CARE
07/26/24 1114   Post-Acute Status   Post-Acute Authorization Hospice   Discharge Plan   Discharge Plan A Hospice/home     SW sent updated Hospice orders to ([facility] Phone # [phone], Fax # [phone]). SW awaiting delivery of Bedside medications to arrange transportation.    Paola Cruz LMSW  Case Management   [facility]   Ext. [phone]

## 2024-07-26 NOTE — PROGRESS NOTES
[facility] - Surgery  Orthopedics  Progress Note    Patient Name: Barby Burrell  MRN: [id number]  Admission Date: 7/22/2024  Hospital Length of Stay: 4 days  Attending Provider: Pinky Adame MD  Primary Care Provider: Lori, Primary Doctor  Follow-up For: Procedure(s) (LRB):  ORIF, FRACTURE, DISTAL FEMUR (Left)    Post-Operative Day: 3 Days Post-Op  Subjective:     Principal Problem:Closed displaced supracondylar fracture of distal end of left femur with intracondylar extension with routine healing    Principal Orthopedic Problem: as above, s/p retrograde IMN 07/23    Interval History: Pt seen and examined at bedside. VSS.  JALYN. Reports no new symptoms. Denies fevers or chill.       Review of patient's allergies indicates:  No Known Allergies    Current Facility-Administered Medications   Medication    0.9%  NaCl infusion (for blood administration)    acetaminophen tablet 1,000 mg    amLODIPine tablet 5 mg    apixaban tablet 2.5 mg    aspirin chewable tablet 81 mg    bisacodyL suppository 10 mg    dextrose 10% bolus 125 mL 125 mL    dextrose 10% bolus 250 mL 250 mL    glucagon (human recombinant) injection 1 mg    glucose chewable tablet 16 g    glucose chewable tablet 24 g    latanoprost 0.005 % ophthalmic solution 1 drop    melatonin tablet 6 mg    memantine tablet 5 mg    naloxone 0.4 mg/mL injection 0.02 mg    ondansetron injection 4 mg    prochlorperazine injection Soln 5 mg    QUEtiapine tablet 100 mg    senna-docusate 8.6-50 mg per tablet 1 tablet    sodium chloride 0.9% flush 10 mL    timolol maleate 0.25% ophthalmic solution 1 drop     Objective:     Vital Signs (Most Recent):  Temp: 99 °F (37.2 °C) (07/26/24 1536)  Pulse: 94 (07/26/24 1536)  Resp: 18 (07/26/24 1536)  BP: (!) 143/72 (07/26/24 1536)  SpO2: (!) 92 % (07/26/24 1536) Vital Signs (24h Range):  Temp:  [97.8 °F (36.6 °C)-99 °F (37.2 °C)] 99 °F (37.2 °C)  Pulse:  [80-94] 94  Resp:  [18] 18  SpO2:  [92 %-97 %] 92 %  BP: (134-172)/(69-77) 143/72  "    Weight: 72.6 kg (160 lb 0.9 oz)  Height: 5' 4" (162.6 cm)  Body mass index is 27.47 kg/m².      Intake/Output Summary (Last 24 hours) at 7/26/2024 1611  Last data filed at 7/25/2024 2045  Gross per 24 hour   Intake --   Output 120 ml   Net -120 ml        Ortho/SPM Exam     AAOx2  NAD  Reg rate  No increased WOB    LLE    Dressing C/D/I   Compartments soft/compressible  Ankle dorsiflexion & plantarflexion intact  SILT  Brisk cap refill  2+ pitting edema of the foot            Significant Labs: All pertinent labs within the past 24 hours have been reviewed.    Significant Imaging: I have reviewed and interpreted all pertinent imaging results/findings.  Assessment/Plan:     * Closed displaced supracondylar fracture of distal end of left femur with intracondylar extension with routine healing s/p ORIF on 7/23/2024  Barby Burrell is a 89 y.o. female with a PMHx of R hip fx 5 years ago s/p IMN and L hip fx 4 years ago s/p short IMN, HTN and dementia presenting with left distal femur fracture, closed, NVI. They take no anticoagulation at home. They were nonambulatory prior to this injury. She is s/p L femur retrograde IMN on 07/23. Ok for staples to be removed by doctor or nursing at the palliative center at least 2 weeks after surgery with evidence of appropriate wound healing. We will f/u with patient in 2 weeks    - DVT PPx: Eliquis  - Abx: post-op ancef completed  - PT/OT: evaluate & treat. WBAT LLE  - Multimodal pain management    Dispo: pt is stable for dc from ortho's perspective          Carlos Romero MD  Orthopedics  [facility] - Surgery    "

## 2024-07-26 NOTE — DISCHARGE SUMMARY
[facility] - [facility] Medicine  Discharge Summary      Patient Name: Barby Burrell  MRN: [id number]  JEFFY: [id number]  Patient Class: IP- Inpatient  Admission Date: 7/22/2024  Hospital Length of Stay: 4 days  Discharge Date and Time: 7/26/2024  4:32 PM  Attending Physician: Pinky Adame MD   Discharging Provider: Pinky Adame MD  Primary Care Provider: Lori Primary Doctor  [facility] Medicine Team: [facility]  Pinky Adame MD  Primary Care Team: [facility]     HPI:   89 yo F with PMHx dementia and HLD who presents to the ED from care facility with L leg pain x a few days. Pt. Alert to person and occasionally polace, but she is unable to provide history. All history obtained per daughter at bedside. Per daughter, pt. Was last seen normal Thursday evening when her sister visited her. On Friday morning she received a call stating that the patient had been complaing of L leg pain and had a swollen knee. Pt. Currently on hospice, but given the apparent acute injury, an X-ray was obtained which revealed a distal femur fracture. Case was discussed with family who decided to send pt. To ED for orthopedic surgery evaluation and potential surgery.    Per family, at baseline pt. Is mostly wheelchair bound. She previously lived in [place] with 24/hr care givers until family decided recently to move her to a healthcare facility with hospice care closer to their home just a few weeks ago.      7/23/2024  Procedure(s) (LRB):  ORIF, FRACTURE, DISTAL FEMUR (Left)    Surgeon(s):  Carlos Romero MD Sugalski, Christopher B., MD      Hospital Course:   Patient was seen and evaluated by Orthopedic surgery who recommended operative repair of closed distal left femoral shaft fracture and closed left distal femur supracondylar fracture. Patient was medically optimized prior to surgery and was taken to OR after optimization on 7/23/2024. Patient underwent ORIF of left femoral shaft fracture with retrograde  intramedullary nail and ORIF of left distal femur supracondylar fracture with intra-articular extension utilizing independent screw by Dr. Kam Navarro. Post-op patient weight bear as tolerated to the left lower extremity as per Orthopedics recommendation. Patient placed on Apixiban 2.5 mg po BID and BHAKTI/SCD's for DVT prophylaxis post-op and will need for total of 30 days. Perineural pain catheter placed by Anesthesia Pain Service with continuous infusion of Ropivacaine to help with pain control post-op and Anesthesia Pain Service managing while patient in the hospital. Patient placed on multimodal pain management post-op with Tylenol 1000 mg po every 8 hours post-op and will continue as pain controlled with that regimen. PT/OT consulted post-op and evaluated patient and recommended low level intensity. Social work/case management discussed with daughters and thy would like patient to return home with home hospice s doing prior to this admit with DME. Patient to go to her daughter's house with home hospice on discharge and anticipate home discharge on 7/26. PNC removed prior to discharge. Pain controlled at time of discharge. Daughters main goal is comfort for patient on discharge and thus discharged home with [facility] to her daughter's house as per family request. DME delivered to daughter's house prior to discharge for wheelchair, Chacorta lift and hospital bed as per family request. Family advised that surgical bandage to remain in place until Orthopedic clinic follow-up and they are aware. They were advised not to get bandage wet. Patient discharged on 30 days of Apixiban for DVT prophylaxis. Patient discharged on Tylenol and Oxycodone prn for pain.      Goals of Care Treatment Preferences:  Code Status: DNR          What is most important right now is to focus on spending time at home, symptom/pain control.         Consults:   Consults (From admission, onward)          Status Ordering Provider      Inpatient consult to Orthopedic Surgery  Once        Provider:  (Not yet assigned)    Completed SESSIONS, HALYIE Alvarado  Severe late onset Alzheimer's dementia without behavioral disturbance, psychotic disturbance, mood disturbance, or anxiety  Patient with known advanced, severe Alzheimer's type dementia on home hospice at home for her dementia. Patient at cognitive baseline currently per family. Patient on Seroquel and Namenda at home to treat and will continue in hospital. Patient placed on delirium precautions in hospital. Please avoid any benzodiazepines as can worsen delirium and only should use in delirium caused by benzodiazepine or alcohol withdrawal. Please avoid all anticholinergic medications as can worsen delirium. Please keep curtains and blinds open during the day and closed during the night. Please continue to have nursing and family reorient patient and encourage family to visit and stay at night if possible. Please avoid physical restraints if possible as likely to worsen and increase agitation in delirious patient. No prn anti-psychotics indicated at this time. Patient discharged home with home hospice with [facility] for her end stage dementia.     Ophtho  Primary open angle glaucoma (POAG) of both eyes, mild stage  Chronic and controlled. Continue home Latanoprost and Timolol eye drops on discharge.       Cardiac/Vascular  Primary hypertension  Chronic, controlled. Latest blood pressure and vitals reviewed-     Temp:  [97.8 °F (36.6 °C)-99 °F (37.2 °C)]   Pulse:  [80-94]   Resp:  [18]   BP: (134-172)/(69-77)   SpO2:  [92 %-97 %] .   Home meds for hypertension were reviewed and noted below.   Hypertension Medications               amLODIPine (NORVASC) 5 MG tablet Take 5 mg by mouth once daily.          Continue home antihypertensive regimen of Norvasc on discharge.     Paroxysmal atrial fibrillation  Patient with Paroxysmal (<7 days) atrial fibrillation which is controlled  currently with  no medications and none needed on discharge . Patient is currently in sinus rhythm.[id number] Score: 3. Anticoagulation not indicated due to had no return of atrial fibrillation since previous hip fracture surgery a few years ago when she had it happen post op and resolved and no issues sinec so stopped Apixiban a few years ago and has been on only Aspirin prophylaxis since then. Patient on home hospice so long term anticoagulation not recommended as against goals of care which is comfort for this patient. Family okay with doing short term Apixiban for DVT prophylaxis for 1 month for fracture surgery and then stop.      Renal/  Acute kidney injury-resolved as of 7/26/2024  Resolved. Patient on admit with Creatinine 1.7 consistent with GIANA. Present on admit. Patient given IVF's and Creatinine improved to 1.2 on 7/23 and stable at 1.1 on 7/25.       Oncology  Normocytic anemia  Stable on discharge. Patient's anemia is currently stable. Has received 1 units of PRBCs on 7/22/2024 pre-op for Hgb 8.4 . Hgb post-op stable since blood transfusion on 7/22. Etiology likely due to chronic disease due to chronic disease and hip fracture.  Current CBC reviewed-   Lab Results   Component Value Date    HGB 8.1 (L) 07/26/2024    HCT 26.8 (L) 07/26/2024         Orthopedic  * Closed displaced supracondylar fracture of distal end of left femur with intracondylar extension with routine healing s/p ORIF on 7/23/2024  Patient found on X-rays at home with home hospice and sent to ED that confirmed on X-ray and CT scan to have closed displaced eft femoral shaft fracture and closed nondisplaced left intra-articular distal femur supracondylar fracture with intracondylar extension. Patient at baseline wheelchair/bedbound but can still perform transfers to go to restroom. Patient was on hospice at home prior to admit for her advanced dementia.   - Orthopedic surgery consulted on admit and discussed surgical options with  family and family decided on surgical intervention to help with pain management. Patient taken to OR on 7/23/2024 and underwent ORIF of left femoral shaft fracture with retrograde intramedullary nail and ORIF of left distal femur supracondylar fracture with intra-articular extension utilizing independent screw with DR. Kam Navarro.  - Post-op, patient is weight bear as tolerated and range of motion as tolerated to left lower extremity as per Ortho. PT/OT consulted with goal for transfers/prior level of function and pain control as family with realistic goals with plan for home hospice long term on discharge. Patient low intensity therapy as per PT/OT and plan is for patient to go to her daughter's Mariza's house on discharge and resume home hospice care at daughter's house as family's primary goal is for patient to be comfortable. DME ordered for martha lift, wheelchair and hospital bed for discharge via hospice agency and delivered.   - Perineural cathter removed by Anesthesia prior to discharge.  - Surgical bandages to remain in place for 2 weeks until Orthopedic clinic follow-up.   - Continue Apixiban 2.5  mg po BID started post-op for DVT prophylaxis for 30 days post-op on discharge.    - Surgical bandages to remain in place to left leg until Orthopedic clinic follow-up.     Palliative Care  ACP (advance care planning)  Advance Care Planning    Date: 07/25/2024    [facility]  I engaged the family in a voluntary conversation about advance care planning and we specifically addressed what the goals of care would be moving forward and family's main goal is for patient to be comfortable. The family endorses that what is most important right now is to focus on spending time at home and symptom/pain control. Plan is for hoem dischareg with hospice care and DME. Patient is DNR as per family wishes.     A total of 10 min was spent on advance care planning, goals of care discussion, emotional support, formulating and  communicating prognosis and exploring burden/benefit of various approaches of treatment. This discussion occurred on a fully voluntary basis with the verbal consent of family.           Hospice care patient  Patient on home hospice at home for her advanced dementia and DNR. Patient to continue to be DNR in hospital and plan for patient to discharge to her daughter's house on discharge with home hospice and DME. Family's goal is for patient to be comfortable and pain free.         Final Active Diagnoses:    Diagnosis Date Noted POA    PRINCIPAL PROBLEM:  Closed displaced supracondylar fracture of distal end of left femur with intracondylar extension with routine healing s/p ORIF on 7/23/2024 [S72.462D] 07/22/2024 Not Applicable    Paroxysmal atrial fibrillation [I48.0] 07/23/2024 Yes    Primary hypertension [I10] 07/22/2024 Yes    Normocytic anemia [D64.9] 07/22/2024 Yes    Severe late onset Alzheimer's dementia without behavioral disturbance, psychotic disturbance, mood disturbance, or anxiety [G30.1, F02.C0] 07/22/2024 Yes    Hospice care patient [Z51.5] 07/23/2024 Not Applicable    Primary open angle glaucoma (POAG) of both eyes, mild stage [H40.1131] 07/23/2024 Yes    ACP (advance care planning) [Z71.89] 07/22/2024 Not Applicable    Closed supracondylar fracture of left femur [S72.452A] 07/23/2024 Yes      Problems Resolved During this Admission:    Diagnosis Date Noted Date Resolved POA    Acute kidney injury [N17.9] 07/22/2024 07/26/2024 Yes       Discharged Condition: fair    Disposition: Hospice/Home ([facility])    Follow Up:    Future Appointments   Date Time Provider Department Center   8/6/2024  9:45 AM Uziel Lyons NP [facility]   9/3/2024  9:30 AM Uziel Lyons NP [facility] [place]        Follow-up Information       [facility] 5th Fl Follow up.    Specialty: Orthopedics  Why: 2 weeks  Contact information:  [address], [address]th Floor  [place]  [place] [id number] [phone]  Additional information:  [facility] - Main Building, 5th Floor   Please park in [address] and take Atrium elevator                         Patient Instructions:      Diet Adult Regular     Notify your health care provider if you experience any of the following:  temperature >100.4     Notify your health care provider if you experience any of the following:  persistent nausea and vomiting or diarrhea     Notify your health care provider if you experience any of the following:  severe uncontrolled pain     Notify your health care provider if you experience any of the following:  redness, tenderness, or signs of infection (pain, swelling, redness, odor or green/yellow discharge around incision site)     Notify your health care provider if you experience any of the following:  difficulty breathing or increased cough     Notify your health care provider if you experience any of the following:  severe persistent headache     Notify your health care provider if you experience any of the following:  worsening rash     Notify your health care provider if you experience any of the following:  persistent dizziness, light-headedness, or visual disturbances     Notify your health care provider if you experience any of the following:  increased confusion or weakness     Sponge bath only until clinic visit     Keep surgical extremity elevated     Ice to affected area     Leave dressing on - Keep it clean, dry, and intact until clinic visit     Weight bearing restrictions (specify):   Order Comments: Weight bear as tolerated to left lower extremity; Range of motion as tolerated to left lower extremity       Significant Diagnostic Studies: Labs: CMP   Recent Labs   Lab 07/25/24  0322 07/26/24  0201    136   K 3.9 3.9    108   CO2 24 20*   GLU 89 90   BUN 35* 30   CREATININE 1.1 0.9   CALCIUM 8.3* 8.3*   PROT 4.8* 4.7*   ALBUMIN 2.5* 2.4*   BILITOT 0.4 0.5   ALKPHOS  83 100   AST 11 12   ALT <5* <5*   ANIONGAP 4* 8    and CBC   Recent Labs   Lab 07/25/24  0322 07/26/24  0201   WBC 8.42 9.40   HGB 8.3* 8.1*   HCT 26.6* 26.8*    293       Pending Diagnostic Studies:       None           Medications:  Reconciled Home Medications:      Medication List        START taking these medications      acetaminophen 500 MG tablet  Commonly known as: TYLENOL  Take 2 tablets (1,000 mg total) by mouth every 8 (eight) hours.     ELIQUIS 2.5 mg Tab  Generic drug: apixaban  Take 1 tablet (2.5 mg total) by mouth 2 (two) times daily. End date aug 28, 2024     latanoprost 0.005 % ophthalmic solution  Place 1 drop into both eyes every evening.     melatonin 3 mg tablet  Commonly known as: MELATIN  Take 2 tablets (6 mg total) by mouth nightly as needed for Insomnia.     oxyCODONE 5 MG immediate release tablet  Commonly known as: ROXICODONE  Take 1 tablet (5 mg total) by mouth every 4 (four) hours as needed (Moderate to severe pain).     senna-docusate 8.6-50 mg 8.6-50 mg per tablet  Commonly known as: PERICOLACE  Take 1 tablet by mouth once daily.            CHANGE how you take these medications      timolol maleate 0.25% 0.25 % Drop  Commonly known as: TIMOPTIC  Place 1 drop into both eyes every evening.  What changed:   how much to take  how to take this  when to take this            CONTINUE taking these medications      amLODIPine 5 MG tablet  Commonly known as: NORVASC  Take 5 mg by mouth once daily.     aspirin 81 MG Chew  Take 81 mg by mouth once daily.     memantine 5 MG Tab  Commonly known as: NAMENDA  Take 5 mg by mouth 2 (two) times daily.     QUEtiapine 100 MG Tab  Commonly known as: SEROQUEL  Take 50 mg by mouth. Take 1/2 pill (50 mg) in am and a whole pill QHS (100 mg)              Indwelling Lines/Drains at time of discharge: None      34 minutes of time spent on discharge, including examining the patient, providing discharge instructions, arranging follow-up and documentation.           Pinky Adame MD  Department of [facility] Medicine  [facility] - Surgery

## 2024-07-26 NOTE — NURSING
Patient states pain is well controlled. Left femoral nerve catheter discontinued. Patient tolerated well. Blue tip visualized. Site is clean and dry. CADD pump returned to anesthesia office.

## 2024-07-26 NOTE — ASSESSMENT & PLAN NOTE
Chronic, controlled. Latest blood pressure and vitals reviewed-     Temp:  [97.8 °F (36.6 °C)-99 °F (37.2 °C)]   Pulse:  [80-94]   Resp:  [18]   BP: (134-172)/(69-77)   SpO2:  [92 %-97 %] .   Home meds for hypertension were reviewed and noted below.   Hypertension Medications               amLODIPine (NORVASC) 5 MG tablet Take 5 mg by mouth once daily.          Continue home antihypertensive regimen of Norvasc on discharge.

## 2024-07-26 NOTE — PHYSICIAN QUERY
Please clarify if there is any clinical correlation between osteopenia and  left femur fracture:  Due to or associated with each other   Left femur fracture is associated with osteoporosis.       TIRSO SUN MD  Attending Staff Physician   Department of Hospital Medicine, [facility]  Pager: [phone]  Spectralink: [phone]

## 2024-07-26 NOTE — CARE UPDATE
PNC infusion has been paused. Dressing CDI. Catheter discontinued, tip intact. Pt tolerated well. Educated regarding continued pain management. Understanding verbalized.    Carrington Hutchinson MD  Department of Anesthesiology  [facility]  07/26/2024 12:14 PM

## 2024-07-26 NOTE — ASSESSMENT & PLAN NOTE
Patient with Paroxysmal (<7 days) atrial fibrillation which is controlled currently with  no medications and none needed on discharge . Patient is currently in sinus rhythm.[id number] Score: 3. Anticoagulation not indicated due to had no return of atrial fibrillation since previous hip fracture surgery a few years ago when she had it happen post op and resolved and no issues sinec so stopped Apixiban a few years ago and has been on only Aspirin prophylaxis since then. Patient on home hospice so long term anticoagulation not recommended as against goals of care which is comfort for this patient. Family okay with doing short term Apixiban for DVT prophylaxis for 1 month for fracture surgery and then stop.

## 2024-07-26 NOTE — PLAN OF CARE
07/26/24 1450   Post-Acute Status   Post-Acute Authorization Hospice   Hospice Status Set-up Complete/Auth obtained   Discharge Plan   Discharge Plan A Home with family;Hospice/home     SW spoke to pt's daughter at bedside. SW called Bedside Med to confirm medications in route. SW arranged stretcher transport via [facility] ([address] [place] [id number]). Requested  time is 4:00 PM . Requested  time does not guarantee arrival time.    Paola Cruz LMSW  Case Management   [facility]   Ext. [phone]

## 2024-07-26 NOTE — ASSESSMENT & PLAN NOTE
Patient with known advanced, severe Alzheimer's type dementia on home hospice at home for her dementia. Patient at cognitive baseline currently per family. Patient on Seroquel and Namenda at home to treat and will continue in hospital. Patient placed on delirium precautions in hospital. Please avoid any benzodiazepines as can worsen delirium and only should use in delirium caused by benzodiazepine or alcohol withdrawal. Please avoid all anticholinergic medications as can worsen delirium. Please keep curtains and blinds open during the day and closed during the night. Please continue to have nursing and family reorient patient and encourage family to visit and stay at night if possible. Please avoid physical restraints if possible as likely to worsen and increase agitation in delirious patient. No prn anti-psychotics indicated at this time. Patient discharged home with home hospice with [facility] for her end stage dementia.

## 2024-07-26 NOTE — SUBJECTIVE & OBJECTIVE
"Principal Problem:Closed displaced supracondylar fracture of distal end of left femur with intracondylar extension with routine healing    Principal Orthopedic Problem: as above, s/p retrograde IMN 07/23    Interval History: Pt seen and examined at bedside. FLORECITA GUNDERSON. Reports no new symptoms. Denies fevers or chill.       Review of patient's allergies indicates:  No Known Allergies    Current Facility-Administered Medications   Medication    0.9%  NaCl infusion (for blood administration)    acetaminophen tablet 1,000 mg    amLODIPine tablet 5 mg    apixaban tablet 2.5 mg    aspirin chewable tablet 81 mg    bisacodyL suppository 10 mg    dextrose 10% bolus 125 mL 125 mL    dextrose 10% bolus 250 mL 250 mL    glucagon (human recombinant) injection 1 mg    glucose chewable tablet 16 g    glucose chewable tablet 24 g    latanoprost 0.005 % ophthalmic solution 1 drop    melatonin tablet 6 mg    memantine tablet 5 mg    naloxone 0.4 mg/mL injection 0.02 mg    ondansetron injection 4 mg    prochlorperazine injection Soln 5 mg    QUEtiapine tablet 100 mg    senna-docusate 8.6-50 mg per tablet 1 tablet    sodium chloride 0.9% flush 10 mL    timolol maleate 0.25% ophthalmic solution 1 drop     Objective:     Vital Signs (Most Recent):  Temp: 99 °F (37.2 °C) (07/26/24 1536)  Pulse: 94 (07/26/24 1536)  Resp: 18 (07/26/24 1536)  BP: (!) 143/72 (07/26/24 1536)  SpO2: (!) 92 % (07/26/24 1536) Vital Signs (24h Range):  Temp:  [97.8 °F (36.6 °C)-99 °F (37.2 °C)] 99 °F (37.2 °C)  Pulse:  [80-94] 94  Resp:  [18] 18  SpO2:  [92 %-97 %] 92 %  BP: (134-172)/(69-77) 143/72     Weight: 72.6 kg (160 lb 0.9 oz)  Height: 5' 4" (162.6 cm)  Body mass index is 27.47 kg/m².      Intake/Output Summary (Last 24 hours) at 7/26/2024 1611  Last data filed at 7/25/2024 2045  Gross per 24 hour   Intake --   Output 120 ml   Net -120 ml        Ortho/SPM Exam     AAOx2  NAD  Reg rate  No increased WOB    LLE    Dressing C/D/I   Compartments " soft/compressible  Ankle dorsiflexion & plantarflexion intact  SILT  Brisk cap refill  2+ pitting edema of the foot            Significant Labs: All pertinent labs within the past 24 hours have been reviewed.    Significant Imaging: I have reviewed and interpreted all pertinent imaging results/findings.

## 2024-07-26 NOTE — ASSESSMENT & PLAN NOTE
Advance Care Planning     Date: 07/25/2024    [facility]  I engaged the family in a voluntary conversation about advance care planning and we specifically addressed what the goals of care would be moving forward and family's main goal is for patient to be comfortable. The family endorses that what is most important right now is to focus on spending time at home and symptom/pain control. Plan is for hoem dischareg with hospice care and DME. Patient is DNR as per family wishes.     A total of 10 min was spent on advance care planning, goals of care discussion, emotional support, formulating and communicating prognosis and exploring burden/benefit of various approaches of treatment. This discussion occurred on a fully voluntary basis with the verbal consent of family.

## 2024-07-26 NOTE — ASSESSMENT & PLAN NOTE
Patient found on X-rays at home with home hospice and sent to ED that confirmed on X-ray and CT scan to have closed displaced eft femoral shaft fracture and closed nondisplaced left intra-articular distal femur supracondylar fracture with intracondylar extension. Patient at baseline wheelchair/bedbound but can still perform transfers to go to restroom. Patient was on hospice at home prior to admit for her advanced dementia.   - Orthopedic surgery consulted on admit and discussed surgical options with family and family decided on surgical intervention to help with pain management. Patient taken to OR on 7/23/2024 and underwent ORIF of left femoral shaft fracture with retrograde intramedullary nail and ORIF of left distal femur supracondylar fracture with intra-articular extension utilizing independent screw with DR. Kam Navarro.  - Post-op, patient is weight bear as tolerated and range of motion as tolerated to left lower extremity as per Ortho. PT/OT consulted with goal for transfers/prior level of function and pain control as family with realistic goals with plan for home hospice long term on discharge. Patient low intensity therapy as per PT/OT and plan is for patient to go to her daughter's Mariza's house on discharge and resume home hospice care at daughter's house as family's primary goal is for patient to be comfortable. DME ordered for martha lift, wheelchair and hospital bed for discharge via hospice agency and delivered.   - Perineural cathter removed by Anesthesia prior to discharge.  - Surgical bandages to remain in place for 2 weeks until Orthopedic clinic follow-up.   - Continue Apixiban 2.5  mg po BID started post-op for DVT prophylaxis for 30 days post-op on discharge.    - Surgical bandages to remain in place to left leg until Orthopedic clinic follow-up.

## 2024-07-26 NOTE — CONSULTS
[facility] - Surgery  Wound Care    Patient Name:  Barby Burrell   MRN:  [id number]  Date: 7/26/2024  Diagnosis: Closed displaced supracondylar fracture of distal end of left femur with intracondylar extension with routine healing    History:     No past medical history on file.    Social History     Socioeconomic History    Marital status:      Social Determinants of Health     Financial Resource Strain: Medium Risk (7/23/2024)    Overall Financial Resource Strain (CARDIA)     Difficulty of Paying Living Expenses: Somewhat hard   Food Insecurity: No Food Insecurity (7/23/2024)    Hunger Vital Sign     Worried About Running Out of Food in the Last Year: Never true     Ran Out of Food in the Last Year: Never true   Transportation Needs: No Transportation Needs (7/23/2024)    TRANSPORTATION NEEDS     Transportation : No   Physical Activity: Inactive (7/23/2024)    Exercise Vital Sign     Days of Exercise per Week: 0 days     Minutes of Exercise per Session: 0 min   Housing Stability: Low Risk  (7/23/2024)    Housing Stability Vital Sign     Unable to Pay for Housing in the Last Year: No     Homeless in the Last Year: No       Precautions:     Allergies as of 07/22/2024    (Not on File)       [facility] Assessment Details/Treatment   Patient seen for inpatient wound care consult. Daughter at bedside and agreeable to inpatient wound care assessment. Small urine incontinence cleaned during assessment. Sacrum noted to have blanchable area of erythema. No open wounds or active drainage noted.         Reviewed chart for this encounter.  See flowsheet for findings.      Recommendations: Cleanse sacrum with sterile normal saline and pat dry. Apply sacral mepilex foam border dressing for protection from friction and shearing. Change every three days or PRN if soiled.    Waffle mattress overlay ordered for pressure redistribution.    No other issues or concerns at this time. Will follow up 8/2/2024 or sooner if  needed.        Discussed POC with patient and primary nurse.  See EMR for orders and patient education.    Bedside nursing to continue care and monitoring.  Bedside to maintain pressure injury prevention interventions.        07/26/24 0815   WOCN Assessment   WOCN Total Time (mins) 30   Visit Date 07/26/24   Visit Time 0815   Consult Type New   WOCN Speciality Wound   Intervention assessed;changed;applied;chart review;coordination of care;orders   Teaching on-going       Orders placed.   Cam NORWOODN, RN  07/26/2024

## 2024-07-30 ENCOUNTER — PATIENT MESSAGE (OUTPATIENT)
Dept: ORTHOPEDICS | Facility: CLINIC | Age: 89
End: 2024-07-30
Payer: MEDICARE

## 2024-08-06 ENCOUNTER — OFFICE VISIT (OUTPATIENT)
Dept: ORTHOPEDICS | Facility: CLINIC | Age: 89
End: 2024-08-06
Payer: MEDICARE

## 2024-08-06 ENCOUNTER — HOSPITAL ENCOUNTER (OUTPATIENT)
Dept: RADIOLOGY | Facility: HOSPITAL | Age: 89
Discharge: HOME | End: 2024-08-06
Attending: NURSE PRACTITIONER
Payer: MEDICARE

## 2024-08-06 DIAGNOSIS — Z98.890 POST-OPERATIVE STATE: ICD-10-CM

## 2024-08-06 DIAGNOSIS — S72.462D CLOSED DISPLACED SUPRACONDYLAR FRACTURE OF DISTAL END OF LEFT FEMUR WITH INTRACONDYLAR EXTENSION WITH ROUTINE HEALING: Primary | ICD-10-CM

## 2024-08-06 DIAGNOSIS — M25.561 ACUTE PAIN OF RIGHT KNEE: ICD-10-CM

## 2024-08-06 DIAGNOSIS — M25.561 ACUTE PAIN OF RIGHT KNEE: Primary | ICD-10-CM

## 2024-08-06 DIAGNOSIS — M25.562 ACUTE PAIN OF LEFT KNEE: Primary | ICD-10-CM

## 2024-08-06 DIAGNOSIS — M25.562 ACUTE PAIN OF LEFT KNEE: ICD-10-CM

## 2024-08-06 PROCEDURE — 73560 X-RAY EXAM OF KNEE 1 OR 2: CPT | Mod: TC,LT

## 2024-08-06 PROCEDURE — 99024 POSTOP FOLLOW-UP VISIT: CPT | Mod: POP,,, | Performed by: NURSE PRACTITIONER

## 2024-08-06 PROCEDURE — 73562 X-RAY EXAM OF KNEE 3: CPT | Mod: 26,RT,, | Performed by: RADIOLOGY

## 2024-08-06 PROCEDURE — 99212 OFFICE O/P EST SF 10 MIN: CPT | Mod: PBBFAC,25 | Performed by: NURSE PRACTITIONER

## 2024-08-06 PROCEDURE — 73560 X-RAY EXAM OF KNEE 1 OR 2: CPT | Mod: 26,LT,, | Performed by: RADIOLOGY

## 2024-08-06 PROCEDURE — 99999 PR PBB SHADOW E&M-EST. PATIENT-LVL II: CPT | Mod: PBBFAC,,, | Performed by: NURSE PRACTITIONER

## 2024-08-06 PROCEDURE — 73562 X-RAY EXAM OF KNEE 3: CPT | Mod: TC,RT

## 2024-08-07 ENCOUNTER — TELEPHONE (OUTPATIENT)
Dept: ORTHOPEDICS | Facility: CLINIC | Age: 89
End: 2024-08-07
Payer: MEDICARE

## 2024-09-30 ENCOUNTER — PATIENT MESSAGE (OUTPATIENT)
Dept: ORTHOPEDICS | Facility: CLINIC | Age: 89
End: 2024-09-30
Payer: MEDICARE

## (undated) DEVICE — DRAPE C-ARMOR EQUIPMENT COVER

## (undated) DEVICE — SPONGE LAP 18X18 PREWASHED

## (undated) DEVICE — DRAPE THREE-QTR REINF 53X77IN

## (undated) DEVICE — DRESSING AQUACEL AG ADV 3.5X6

## (undated) DEVICE — SUT FIBERWIRE 2 38 IN TAPER

## (undated) DEVICE — DRAPE INCISE IOBAN 2 23X33IN

## (undated) DEVICE — DRILL T2 ALPHA LOK 4.2X360MM

## (undated) DEVICE — STOCKINET 4INX48

## (undated) DEVICE — BIT DRILL TWST 4.9 FOR 6.5 SCR

## (undated) DEVICE — SOCKINETTE IMPERVIOUS 12X48IN

## (undated) DEVICE — BNDG COFLEX FOAM LF2 ST 6X5YD

## (undated) DEVICE — GUIDE WIRE, BALL-TIPPED, STERILE
Type: IMPLANTABLE DEVICE | Site: FEMUR | Status: NON-FUNCTIONAL
Removed: 2024-07-23

## (undated) DEVICE — DRAPE TOP 53X102IN

## (undated) DEVICE — TUBE SUCTION FRAZIER VENT 10FR

## (undated) DEVICE — K-WIRE, STERILE
Type: IMPLANTABLE DEVICE | Site: FEMUR | Status: NON-FUNCTIONAL
Removed: 2024-07-23

## (undated) DEVICE — ELECTRODE REM PLYHSV RETURN 9

## (undated) DEVICE — DRESSING XEROFORM 1X8IN

## (undated) DEVICE — SUT VICRYL PLUS 0 CT1 18IN

## (undated) DEVICE — COVER PROXIMA MAYO STAND

## (undated) DEVICE — TAPE SURG DURAPORE 2 X10YD

## (undated) DEVICE — DRAPE C-ARM ELAS CLIP 42X120IN

## (undated) DEVICE — APPLICATOR CHLORAPREP ORN 26ML

## (undated) DEVICE — DRESSING TRANS 4X4 TEGADERM

## (undated) DEVICE — DRAPE ORTH SPLIT 77X108IN

## (undated) DEVICE — PENCIL VALLEYLAB TELSCP SMK

## (undated) DEVICE — TIP YANKAUERS BULB NO VENT

## (undated) DEVICE — SUT VICRYL PLUS 2-0 CT1 18

## (undated) DEVICE — PACK ECLIPSE SET-UP W/O DRAPE

## (undated) DEVICE — BIT DRILL ASNIS III 3.2X300MM

## (undated) DEVICE — SUT MONOCRYL 3-0 PS-2 UND

## (undated) DEVICE — SCALPEL #10 BLADE STRL DISP

## (undated) DEVICE — DRAPE U SPLIT SHEET 54X76IN

## (undated) DEVICE — HOOD T7 W/ PEEL AWAY LENS

## (undated) DEVICE — REAMER SHAFT MOD TRINKLE 8X510

## (undated) DEVICE — TOWEL OR DISP STRL BLUE 4/PK

## (undated) DEVICE — SPONGE COTTON TRAY 4X4IN

## (undated) DEVICE — TRAY MINOR ORTHO OMC

## (undated) DEVICE — ADHESIVE DERMABOND ADVANCED

## (undated) DEVICE — STAPLER SKIN PROXIMATE WIDE